# Patient Record
Sex: MALE | Race: WHITE | URBAN - METROPOLITAN AREA
[De-identification: names, ages, dates, MRNs, and addresses within clinical notes are randomized per-mention and may not be internally consistent; named-entity substitution may affect disease eponyms.]

---

## 2020-09-24 LAB
ABS NEUT (OLG): 4.5 X10(3)/MCL (ref 2.1–9.2)
ALBUMIN SERPL-MCNC: 4.4 GM/DL (ref 3.5–5)
ALBUMIN/GLOB SERPL: 1.4 RATIO (ref 1.1–2)
ALP SERPL-CCNC: 75 UNIT/L (ref 40–150)
ALT SERPL-CCNC: 15 UNIT/L (ref 0–55)
AST SERPL-CCNC: 16 UNIT/L (ref 5–34)
BASOPHILS # BLD AUTO: 0 X10(3)/MCL (ref 0–0.2)
BASOPHILS NFR BLD AUTO: 1 %
BILIRUB SERPL-MCNC: 0.8 MG/DL
BILIRUBIN DIRECT+TOT PNL SERPL-MCNC: 0.3 MG/DL (ref 0–0.5)
BILIRUBIN DIRECT+TOT PNL SERPL-MCNC: 0.5 MG/DL (ref 0–0.8)
BUN SERPL-MCNC: 17 MG/DL (ref 8.9–20.6)
CALCIUM SERPL-MCNC: 9.4 MG/DL (ref 8.4–10.2)
CHLORIDE SERPL-SCNC: 105 MMOL/L (ref 98–107)
CO2 SERPL-SCNC: 29 MMOL/L (ref 22–29)
CREAT SERPL-MCNC: 1.43 MG/DL (ref 0.73–1.18)
EOSINOPHIL # BLD AUTO: 0.1 X10(3)/MCL (ref 0–0.9)
EOSINOPHIL NFR BLD AUTO: 2 %
ERYTHROCYTE [DISTWIDTH] IN BLOOD BY AUTOMATED COUNT: 12.4 % (ref 11.5–17)
GLOBULIN SER-MCNC: 3.2 GM/DL (ref 2.4–3.5)
GLUCOSE SERPL-MCNC: 90 MG/DL (ref 74–100)
HCT VFR BLD AUTO: 42 % (ref 42–52)
HGB BLD-MCNC: 14.6 GM/DL (ref 14–18)
LYMPHOCYTES # BLD AUTO: 1.5 X10(3)/MCL (ref 0.6–4.6)
LYMPHOCYTES NFR BLD AUTO: 23 %
MCH RBC QN AUTO: 29.1 PG (ref 27–31)
MCHC RBC AUTO-ENTMCNC: 34.8 GM/DL (ref 33–36)
MCV RBC AUTO: 83.7 FL (ref 80–94)
MONOCYTES # BLD AUTO: 0.4 X10(3)/MCL (ref 0.1–1.3)
MONOCYTES NFR BLD AUTO: 6 %
NEUTROPHILS # BLD AUTO: 4.5 X10(3)/MCL (ref 2.1–9.2)
NEUTROPHILS NFR BLD AUTO: 68 %
PLATELET # BLD AUTO: 282 X10(3)/MCL (ref 130–400)
PMV BLD AUTO: 9.9 FL (ref 9.4–12.4)
POTASSIUM SERPL-SCNC: 4.8 MMOL/L (ref 3.5–5.1)
PROT SERPL-MCNC: 7.6 GM/DL (ref 6.4–8.3)
RBC # BLD AUTO: 5.02 X10(6)/MCL (ref 4.7–6.1)
SODIUM SERPL-SCNC: 142 MMOL/L (ref 136–145)
WBC # SPEC AUTO: 6.6 X10(3)/MCL (ref 4.5–11.5)

## 2020-09-28 ENCOUNTER — HISTORICAL (OUTPATIENT)
Dept: SURGERY | Facility: HOSPITAL | Age: 45
End: 2020-09-28

## 2022-04-30 NOTE — OP NOTE
DATE OF SURGERY:    09/28/2020    SURGEON:  Joel Talbot MD    PROCEDURES:    1. Left renal extracorporeal shock wave lithotripsy.  2. Flexible cystoscopy with removal of indwelling left double-J ureteral stent.    ANESTHESIA:  General LMA.    PREOP DIAGNOSES:    1. Left renal calculus.  2. Indwelling left double-J ureteral stent.    POSTOP DIAGNOSES:    1. Left renal calculus.  2. Indwelling left double-J ureteral stent.    INDICATIONS:  This 45-year-old gentleman presented recently with left renal colic.  Left double-J stent was placed.  The stone migrated up into the left lower pole kidney, was faintly calcified.  Procedure today was indicated to try to fragment the stone and remove the bothersome stent.    OPERATIVE FINDINGS:  An 11 mm left lower pole, faintly calcified stone that appeared to fragment nicely post ESWL.  Indwelling double-J stent in good position, removed at the end of the procedure with flexible cystoscopy.    DESCRIPTION OF PROCEDURE:  The patient was taken to the operating room and placed supine on the Dornier lithotripsy table.  Placed under general LMA anesthesia.  Fluoroscopy was used and the stone was visualized in the left lower pole kidney.  AP and oblique images were used to position the stone within the cross hair for treatment.  Per protocol, treatment was started with shock rate of 60 per minute and a power level of 1, gradually increasing to shock rate of 90 per minute and power level of 7.  A total of 2700 shocks were given and the stone did appear to fragment and change configuration, indicative of fragmentation.     Lower abdomen and genitalia were then prepped and draped and flexible cystoscopy was performed.  Endoscopic forceps were used to grasp the indwelling left double-J ureteral stent and carefully remove it.  Xylocaine jelly was placed per urethra.  The patient tolerated these procedures well.  He did not have any     cardiac arrhythmias during the  lithotripsy procedure.  Vital signs were stable and he was awakened and transferred to PACU in stable condition.        ______________________________  Joel Talbot MD    EFNENA/UD  DD:  09/28/2020  Time:  10:10AM  DT:  09/28/2020  Time:  10:36AM  Job #:  [id number]

## 2024-05-24 ENCOUNTER — TELEPHONE (OUTPATIENT)
Dept: SURGERY | Facility: CLINIC | Age: 49
End: 2024-05-24

## 2024-07-02 RX ORDER — ATORVASTATIN CALCIUM 40 MG/1
40 TABLET, FILM COATED ORAL
COMMUNITY
Start: 2024-04-09

## 2024-07-02 RX ORDER — LISINOPRIL 40 MG/1
40 TABLET ORAL
COMMUNITY
Start: 2024-04-09

## 2024-07-02 RX ORDER — TESTOSTERONE CYPIONATE 200 MG/ML
INJECTION, SOLUTION INTRAMUSCULAR
COMMUNITY
Start: 2024-05-01

## 2024-07-30 ENCOUNTER — OFFICE VISIT (OUTPATIENT)
Dept: SURGERY | Facility: CLINIC | Age: 49
End: 2024-07-30
Payer: COMMERCIAL

## 2024-07-30 ENCOUNTER — CLINICAL SUPPORT (OUTPATIENT)
Dept: BARIATRICS | Facility: HOSPITAL | Age: 49
End: 2024-07-30

## 2024-07-30 VITALS
DIASTOLIC BLOOD PRESSURE: 90 MMHG | SYSTOLIC BLOOD PRESSURE: 144 MMHG | WEIGHT: 293.69 LBS | BODY MASS INDEX: 44.51 KG/M2 | BODY MASS INDEX: 44.51 KG/M2 | HEIGHT: 68 IN | HEART RATE: 69 BPM | WEIGHT: 293.69 LBS | HEIGHT: 68 IN

## 2024-07-30 VITALS — WEIGHT: 297.31 LBS

## 2024-07-30 DIAGNOSIS — E66.01 MORBID OBESITY WITH BMI OF 45.0-49.9, ADULT: Primary | ICD-10-CM

## 2024-07-30 DIAGNOSIS — E66.9 OBESITY: Primary | ICD-10-CM

## 2024-07-30 PROCEDURE — 3008F BODY MASS INDEX DOCD: CPT | Mod: CPTII,,, | Performed by: SURGERY

## 2024-07-30 PROCEDURE — 99204 OFFICE O/P NEW MOD 45 MIN: CPT | Mod: ,,, | Performed by: SURGERY

## 2024-07-30 PROCEDURE — 3080F DIAST BP >= 90 MM HG: CPT | Mod: CPTII,,, | Performed by: SURGERY

## 2024-07-30 PROCEDURE — 3077F SYST BP >= 140 MM HG: CPT | Mod: CPTII,,, | Performed by: SURGERY

## 2024-07-30 PROCEDURE — 4010F ACE/ARB THERAPY RXD/TAKEN: CPT | Mod: CPTII,,, | Performed by: SURGERY

## 2024-07-30 RX ORDER — ALLOPURINOL 100 MG/1
TABLET ORAL DAILY
COMMUNITY

## 2024-07-30 RX ORDER — INDOMETHACIN 50 MG/1
CAPSULE ORAL
COMMUNITY
Start: 2024-07-14

## 2024-07-30 NOTE — PROGRESS NOTES
NUTRITIONAL CONSULT    Initial assessment for sleeve gastrectomy  Non Surgical: []    PAST HISTORY:   Dieting attempts include ... Intermittent fasting, working out,   Highest adult weight: 318#  How many years at present wt: 6 months  Greatest single wt loss: 80#, intermittent fasting and working out    CLINICAL DATA:  Height:   Ht Readings from Last 1 Encounters:   No data found for Ht      Weight:   Wt Readings from Last 3 Encounters:   24 1431 134.9 kg (297 lb 4.8 oz)      IBW: 148 lbs   BMI:   BMI Readings from Last 1 Encounters:   No data found for BMI      Bariatric goal weight (125% EBW): 185 lbs  Patient's goal weight: 215 lbs    FAMILY HISTORY OF OBESITY:  Yes           WHAT SIDE OF THE FAMILY?   []Mother   []Father   []Sibling   []Child     [x]Extended    []Adopted       Goal for Bariatric Surgery: to improve health, to improve quality of life, to lose weight, and to prevent future medical conditions    NUTRITION & HEALTH HISTORY:  Greatest challenge: dining out frequency, portion control, and high-fat diet    Current diet recall:   Dinner: 1/2 pizza- intermittent fasting    Current Dietary Patterns:  Meal pattern: 1 intermittent fasting  Snackin / day Type: before bed - cheese toast   Vegetables: Likes a few. Eats rarely.  Fruits: Likes a few. Eats rarely.  Beverages: water and diet soda  Alcohol consumption: Never. Type:   Dining out: Weekly. Mostly fast food and restaurants. 50% of the time- more on weekends  Grocery shopping and food prep: Yes and No[x]Self   [x]Spouse   []Other:   Emotional eating: No Which emotions if yes:   Nighttime snacking: Yes Middle of night: No Before bedtime: Yes  Hx of disordered eating behaviors: No Anorexia: No Bulimia: No Purging: No Binging:No  History of vitamin/mineral deficiencies:   No    Vitamins / Minerals / Herbs:   none    Food Allergies:   none      ASSESSMENT:  Patient reports attempts at weight loss, only to regain lost weight.  Patient demonstrated  knowledge of healthy eating behaviors and exercise patterns; admits to not eating healthy and not exercising at this point.  Patient states willingness to change lifestyle and make behavior modifications.  Expect good  compliance after surgery at this time.        ESTIMATED NEEDS: 84 kg or 181#  Calories: 4889-5922 (20-25 kcal/kg adjusted BW/d)  Protein: 84-92 (1.0-1.1 g/kg adjusted BW/d)  Fluid:  1680 (20 mL/kg actual BW/d)    BARIATRIC DIET DISCUSSION:  Discussed diet after surgery and related to patients food record.  Reviewed diet progression before and after surgery.  Reinforced that surgery is not a magic bullet and importance of low fat foods and no snacking.  Answered all questions.    RECOMMENDATIONS:  Patient is a potential candidate for bariatric surgery.      PLAN:  Continue to review Bariatric Nutrition Guidebook at home and call with any questions.  Work on Bariatric Nutrition Checklist.  5-6 meals per day.  Reduce frequency of dining out.

## 2024-07-30 NOTE — PROGRESS NOTES
BEHAVIOR MODIFICATION AND EXERCISE CONSULT    PERSONAL:     What initiated your interest in bariatric surgery?   Tried to lose weight in the past.     Marital Status: []Single   [x]   []   []      Do you have children? 2     Do you have childcare issues?     What is your highest level of education completed? Masters    Who is your social or relational support? wife    Do you work? 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 Sometimes it is stressful    Were there any particular events that lead to significant weight gain? []Loss of a loved one  []Pregnancy  []Trauma, accident, illness  []Loss of employment [x]Other (covid)    PHYSICAL ACTIVITY:    Do you currently exercise: [x]Yes   []No    If so, please describe: walk    Have you experienced any injuries and/or restrictions that may limit your physical activity? [x]Yes   []No    If so, please describe:     BEHAVIORS:    What behavior(s) would you like to change in order to be healthy? Eating healthier, portion control,     On a scale of 1-10 (1-extremely low, 10-extremely high), how motivated are you to change your behavior(s)? 10    Do you currently use any type of tobacco products (vape, dip, cigarettes, etc.) No    If yes, on average, how many and/or how often do you use these products on a daily basis?    How many hours of sleep do you average? 8    Rate your stress level on a scale of 1-10 (1-extremely low, 10-extremely high) 4/5    What is your biggest stressor? work    Is your appetite affected by stress, boredom, depression, etc.? no    How do you cope and/or manage stress? Fish, play with son,     Have you ever seen a counselor or therapist?      CURRENT WEIGHT: 293.7 HIGHEST WEIGHT:318 LOWEST ADULT WEIGHT: 198 GOAL WEIGHT: 215    WAIST/HIP:    HANDOUTS:    NOTES:       Genesis De La Rosa, ROSA ISELA, CPT, CHC

## 2024-08-08 LAB — UREA BREATH TEST QL: NEGATIVE

## 2024-08-12 ENCOUNTER — TELEPHONE (OUTPATIENT)
Dept: SURGERY | Facility: CLINIC | Age: 49
End: 2024-08-12
Payer: COMMERCIAL

## 2024-08-13 DIAGNOSIS — E66.01 MORBID OBESITY WITH BMI OF 45.0-49.9, ADULT: Primary | ICD-10-CM

## 2024-08-19 DIAGNOSIS — E66.01 MORBID OBESITY WITH BMI OF 45.0-49.9, ADULT: Primary | ICD-10-CM

## 2024-08-20 ENCOUNTER — CLINICAL SUPPORT (OUTPATIENT)
Dept: BARIATRICS | Facility: HOSPITAL | Age: 49
End: 2024-08-20

## 2024-08-20 VITALS — WEIGHT: 299.19 LBS | BODY MASS INDEX: 45.34 KG/M2 | HEIGHT: 68 IN

## 2024-08-20 DIAGNOSIS — E66.01 MORBID OBESITY WITH BMI OF 45.0-49.9, ADULT: Primary | ICD-10-CM

## 2024-08-20 NOTE — PROGRESS NOTES
Date of education: 8/20/24  Pt education type: [x]Pre op  []Post op  Surgery date: 9/16/24  Type of surgery: sleeve gastrectomy     Education was provided on:   [x]Importance of protein and vitamin protocol  [x]Importance of drinking  fl oz/day of non carbonated sugar free non caffeinated beverages  [x]Importance of following dietary protocol  []Importance of early ambulation postop   []Use of incentive spirometer 10 times an hour while awake  []Non opiod pain management post op   []Discontinuing use of meds containing aspirin, ibuprofen, NSAIDs, post op  []Signs and symptoms of immediate and long term complications post-op  []Prevention and signs and symptoms of blood clots   []Prevention and signs of infection  []Reviewed medication regimen  []Importance of adhering to behavioral changes  []Importance of following exercise protocol      Barriers to learning:  [x]None evident  []Acuity of illness  []Cognitive defects  []Cultural barriers  []Desire/Motivation  []Difficulty concentrating  []Emotional state  []Financial concerns  []Hearing deficit  []Language barrier  []Literacy  []Memory problems  []Vision impairment     Teaching methods:  []Demonstration  [x]Explanation  [x]Printed materials  [x]Teach back  []Virtual/web based    Expected Compliance:  [x]Good  []Fair  []Poor    Additional Notes:

## 2024-08-21 NOTE — PROGRESS NOTES
Date of education: 8/20/24  Pt education type: [x]Pre op  []Post op  Surgery date: 9/16/24  Type of surgery: sleeve gastrectomy     Education was provided on:   []Importance of protein and vitamin protocol  []Importance of drinking  fl oz/day of non carbonated sugar free non caffeinated beverages  []Importance of following dietary protocol  [x]Importance of early ambulation postop   [x]Use of incentive spirometer 10 times an hour while awake  [x]Non opiod pain management post op   [x]Discontinuing use of meds containing aspirin, ibuprofen, NSAIDs, post op  [x]Signs and symptoms of immediate and long term complications post-op  [x]Prevention and signs and symptoms of blood clots   [x]Prevention and signs of infection  [x]Reviewed medication regimen  []Importance of adhering to behavioral changes  []Importance of following exercise protocol      Barriers to learning:  [x]None evident  []Acuity of illness  []Cognitive defects  []Cultural barriers  []Desire/Motivation  []Difficulty concentrating  []Emotional state  []Financial concerns  []Hearing deficit  []Language barrier  []Literacy  []Memory problems  []Vision impairment     Teaching methods:  []Demonstration  [x]Explanation  [x]Printed materials  [x]Teach back  []Virtual/web based    Expected Compliance:  [x]Good  []Fair  []Poor    Additional Notes:

## 2024-08-27 PROBLEM — E66.01 MORBID OBESITY WITH BMI OF 45.0-49.9, ADULT: Status: ACTIVE | Noted: 2024-08-27

## 2024-08-27 NOTE — PROGRESS NOTES
"  [facility]  [address] [place] [id number]  Phone: [phone]  Fax: [phone]    Initial Bariatric Consultation  Dr. Yury Meza  1975    Author: GIULIANA Jenkins      History of Present Illness:  Patient is a 49 y.o. male who is referred for evaluation of surgical treatment of morbid obesity. His Body mass index is 45.32 kg/m².  He has attended the bariatric seminar and is most interested in the sleeve gastrectomy procedure. The patient has had multiple unsuccessful diet attempts in the past without sustained weight loss. With multiple unsuccessful weight loss attempts, the patient believes they are ready for a bariatric surgical intervention.     Denies any anemia, bleeding or clotting disorders, easy bruisability, or previous thrombosis. No family history of clotting issues.     Ht: 5' 7.5" (1.715 m)   Weights:     Trend Weights BMI   Weight today at consult 293 # Body mass index is 45.32 kg/m².       Estimated body mass index is 45.32 kg/m² as calculated from the following:    Height as of this encounter: 5' 7.5" (1.715 m).    Weight as of this encounter: 133.2 kg (293 lb 11.2 oz).      Pertinent History:  HTN - [x] Yes   [] No  HLD - [x] Yes   [] No  DM  -  [] Yes   [x] No  NATHANIEL - [x] Yes   [] No (uses CPAP)  GERD - [] Yes   [x] No  Anticoagulation- [] Yes   [x] No  Smoker- [] Yes   [x] No      Past Medical History:   Diagnosis Date    High cholesterol     Hypertension     Kidney stone     Skin cancer     Sleep apnea     uses CPAP     Past Surgical History:   Procedure Laterality Date    COLONOSCOPY      EYE SURGERY      NASAL SINUS SURGERY      Skin cancer removed      x5     Family History   Problem Relation Name Age of Onset    Hyperlipidemia Mother Alysa Meza     Hypertension Father Rodrigo Meza     Heart disease Father Rodrigo Meza     Kidney failure Paternal Grandmother Jada Meza  " "    Social History     Tobacco Use    Smoking status: Never    Smokeless tobacco: Never   Substance Use Topics    Alcohol use: Never    Drug use: Never          Review of patient's allergies indicates:  No Known Allergies    Current Outpatient Medications   Medication Sig Dispense Refill    atorvastatin (LIPITOR) 40 MG tablet Take 40 mg by mouth every evening.      indomethacin (INDOCIN) 50 MG capsule TAKE 1 CAPSULE BY MOUTH IN THE MORNING AT NOON AND AT BEDTIME FOR 10 DAYS      lisinopriL (PRINIVIL,ZESTRIL) 40 MG tablet Take 40 mg by mouth once daily.      testosterone cypionate (DEPOTESTOTERONE CYPIONATE) 200 mg/mL injection SMARTSI.5 Milliliter(s) IM Every 2 Weeks      allopurinoL (ZYLOPRIM) 100 MG tablet Take by mouth once daily.       No current facility-administered medications for this visit.       Patient Care Team:  Tl Santana MD as PCP - General (Family Medicine)  Yury Narvaez MD as Surgeon (Bariatrics)    Review of Systems:  Review of Systems   Constitutional: Negative.    HENT: Negative.     Eyes: Negative.    Respiratory: Negative.     Cardiovascular: Negative.    Gastrointestinal: Negative.    Endocrine: Negative.    Genitourinary: Negative.    Musculoskeletal: Negative.    Skin: Negative.    Allergic/Immunologic: Negative.    Neurological: Negative.    Psychiatric/Behavioral: Negative.     All other systems reviewed and are negative.      Physical:     Vitals:    24 1542   BP: (!) 144/90   Pulse: 69   Weight: 133.2 kg (293 lb 11.2 oz)   Height: 5' 7.5" (1.715 m)     Body mass index is 45.32 kg/m².    Physical Exam:  Physical Exam  Vitals reviewed.   Constitutional:       General: He is not in acute distress.     Appearance: Normal appearance. He is obese.   HENT:      Head: Normocephalic.   Eyes:      Conjunctiva/sclera: Conjunctivae normal.      Pupils: Pupils are equal, round, and reactive to light.   Cardiovascular:      Rate and Rhythm: Normal rate and regular rhythm.      " Pulses: Normal pulses.      Heart sounds: Normal heart sounds.   Pulmonary:      Effort: Pulmonary effort is normal. No respiratory distress.      Breath sounds: Normal breath sounds.   Abdominal:      General: Abdomen is flat. Bowel sounds are normal.      Palpations: Abdomen is soft.      Tenderness: There is no abdominal tenderness.   Musculoskeletal:         General: Normal range of motion.      Cervical back: Neck supple.   Skin:     General: Skin is warm and dry.   Neurological:      General: No focal deficit present.      Mental Status: He is alert and oriented to person, place, and time.   Psychiatric:         Mood and Affect: Mood normal.         Behavior: Behavior normal.         ASSESSMENT/PLAN:        1. Morbid obesity with BMI of 45.0-49.9, adult            Plan:  Deion Meza has morbid obesity as their Body mass index is 45.32 kg/m². He would benefit from weight loss surgery and has chosen Laparoscopic sleeve gastrectomy as the preferred procedure. He understands that this is a tool and lifestyle change will be necessary to keep weight off. Dr. Yury Narvaez discussed possible complications of all surgeries with the patient and he is agreeable to surgery. Patient informed that sleeve gastrectomy may result in symptoms of worsening reflux, possibly requiring long term medication, or additional surgical intervention in the future (including but not limited to conversion to Anna-en-Y gastric bypass). Future surgical intervention may/may not be covered by patient's insurance. Patient accepts this risk and is willing to proceed with workup for sleeve gastrectomy procedure.     RECOMMENDATION: Weight loss surgery. The risks, benefits of bariatric surgery, and non-surgical alternatives were discussed at length and all  questions were answered. The patient verbalizes understanding and wishes to proceed.     - Order H Pylori serology  - Schedule EGD upper endoscopy  - Refer to bariatric department for  pre op education, nutritional counseling.   - Refer for pre op psychological evaluation.  - Dr. Narvaez examined patient, discussed recommendations, obtained informed consent, and answered all questions.     GIULIANA Jenkins      I, GIULIANA Benavides, am scribing for, and in the presence of, Yury Narvaez MD, performed the above scribed service and the documentation accurately describes the services I performed. I attest to the accuracy of the note.

## 2024-08-30 ENCOUNTER — ANESTHESIA EVENT (OUTPATIENT)
Dept: SURGERY | Facility: HOSPITAL | Age: 49
DRG: 621 | End: 2024-08-30
Payer: COMMERCIAL

## 2024-09-03 ENCOUNTER — CLINICAL SUPPORT (OUTPATIENT)
Dept: BARIATRICS | Facility: HOSPITAL | Age: 49
End: 2024-09-03

## 2024-09-03 ENCOUNTER — OFFICE VISIT (OUTPATIENT)
Dept: SURGERY | Facility: CLINIC | Age: 49
End: 2024-09-03
Payer: COMMERCIAL

## 2024-09-03 VITALS — BODY MASS INDEX: 44.1 KG/M2 | HEIGHT: 68 IN | WEIGHT: 291 LBS | BODY MASS INDEX: 44.9 KG/M2 | WEIGHT: 291 LBS

## 2024-09-03 VITALS
WEIGHT: 290 LBS | SYSTOLIC BLOOD PRESSURE: 144 MMHG | DIASTOLIC BLOOD PRESSURE: 84 MMHG | BODY MASS INDEX: 43.95 KG/M2 | HEIGHT: 68 IN | HEART RATE: 87 BPM

## 2024-09-03 DIAGNOSIS — E66.9 OBESITY: Primary | ICD-10-CM

## 2024-09-03 DIAGNOSIS — Z71.82 EXERCISE COUNSELING: ICD-10-CM

## 2024-09-03 DIAGNOSIS — E66.01 MORBID OBESITY WITH BMI OF 40.0-44.9, ADULT: Primary | ICD-10-CM

## 2024-09-03 DIAGNOSIS — Z01.818 PRE-OPERATIVE EXAMINATION: Primary | ICD-10-CM

## 2024-09-03 DIAGNOSIS — Z71.89 ENCOUNTER FOR PRE-BARIATRIC SURGERY COUNSELING AND EDUCATION: ICD-10-CM

## 2024-09-03 DIAGNOSIS — Z71.3 ENCOUNTER FOR WEIGHT LOSS COUNSELING: ICD-10-CM

## 2024-09-03 DIAGNOSIS — Z71.3 DIETARY COUNSELING: ICD-10-CM

## 2024-09-03 DIAGNOSIS — E66.01 MORBID OBESITY: ICD-10-CM

## 2024-09-03 PROCEDURE — 1159F MED LIST DOCD IN RCRD: CPT | Mod: CPTII,,, | Performed by: NURSE PRACTITIONER

## 2024-09-03 PROCEDURE — 4010F ACE/ARB THERAPY RXD/TAKEN: CPT | Mod: CPTII,,, | Performed by: NURSE PRACTITIONER

## 2024-09-03 PROCEDURE — 3008F BODY MASS INDEX DOCD: CPT | Mod: CPTII,,, | Performed by: NURSE PRACTITIONER

## 2024-09-03 PROCEDURE — 99214 OFFICE O/P EST MOD 30 MIN: CPT | Mod: ,,, | Performed by: NURSE PRACTITIONER

## 2024-09-03 PROCEDURE — 3077F SYST BP >= 140 MM HG: CPT | Mod: CPTII,,, | Performed by: NURSE PRACTITIONER

## 2024-09-03 PROCEDURE — 3079F DIAST BP 80-89 MM HG: CPT | Mod: CPTII,,, | Performed by: NURSE PRACTITIONER

## 2024-09-03 NOTE — PROGRESS NOTES
"  Patient ID: [id number]   Chief Complaint: Pre-op Exam (VSG 9/16/24)    HPI:     History of Present Illness:  Deion Meza is a 49 y.o. male here today for routine preop appt. The pt is scheduled for a laparoscopic sleeve gastrectomy on 9/16/24. Pt Pt denies any changes to  history since last examination. All preop requirements completed. No complaints. Ready to proceed.     H.Pylori (8/5/24): negative  EGD (8/30/24): normal    Ht: 67.5in  Weights:   Trend Weights BMI   Starting 293# 45   Pre-Op 290# 44   2 Week     2 Month     6 Month      1 Year     2 Year               For Adults 20 Years and Older:  BMI Weight Status   Below 18.5 Underweight   18.6-24.9 Normal/Healthy   25.0-29.9 Overweight   30.0 & Above Obese     Ideal Weight Range for Your Height: 5'7" = 121 - 158 lbs      Pertinent History:  HTN - [x] Yes   [] No    [] Resolved  HLD - [x] Yes   [] No    [] Resolved  DM  -  [] Yes   [x] No    [] Resolved  NATHANIEL - [x] Yes   [] No   [] Resolved  GERD - [] Yes   [x] No [] Resolved  Anticoagulation- [] Yes   [x] No      If yes, type: [] ASA [] Plavix [] Other    Patient Care Team:  Tl Santana MD as PCP - General (Family Medicine)  Yury Narvaez MD as Surgeon (Bariatrics)     Subjective:     See HPI for details    Constitutional: Denies Change in appetite. Denies Chills. Denies Fever. Denies Night sweats.  Respiratory: Denies Cough. Denies Shortness of breath. Denies Shortness of breath with exertion. Denies Wheezing.  Cardiovascular: Denies Chest pain at rest. Denies Chest pain with exertion. Denies Irregular heartbeat. Denies Palpitations. Denies Edema.  Gastrointestinal: Denies Abdominal pain. DeniesDiarrhea. Denies Nausea. Denies Vomiting. Denies Hematemesis or Hematochezia.  Genitourinary: Denies Dysuria. Denies Urinary frequency. Denies Urinary urgency. Denies Blood in urine.  Endocrine: Denies Cold intolerance. Denies Excessive thirst. Denies Heat intolerance. Endorses Weight loss. " "  Musculoskeletal: Denies Painful joints. Denies Weakness.  Integumentary: Denies Rash. Denies Itching. Denies Dry skin.  Neurologic: Denies Dizziness. Denies Fainting. Denies Headache.  Psychiatric: Denies Depression. Denies Anxiety. Denies Suicidal/Homicidal ideations.    12 point review of systems conducted, negative except as stated in the history of present illness. See HPI for details.    Review of patient's allergies indicates:  No Known Allergies  Past Medical History:   Diagnosis Date    High cholesterol     Hypertension     Kidney stone     Morbid obesity with BMI of 45.0-49.9, adult     Skin cancer     Sleep apnea     uses CPAP     Past Surgical History:   Procedure Laterality Date    COLONOSCOPY      EYE SURGERY      NASAL SINUS SURGERY      Skin cancer removed      x5     Family History   Problem Relation Name Age of Onset    Hyperlipidemia Mother Alysa Meza     Hypertension Father Rodrigo Meza     Heart disease Father Rodrigo Meza     Kidney failure Paternal Grandmother Jada Meza      Social History     Tobacco Use    Smoking status: Never    Smokeless tobacco: Never   Substance Use Topics    Alcohol use: Never    Drug use: Never      Current Outpatient Medications   Medication Instructions    allopurinoL (ZYLOPRIM) 100 MG tablet Oral, Daily    atorvastatin (LIPITOR) 40 mg, Oral, Nightly    indomethacin (INDOCIN) 50 MG capsule TAKE 1 CAPSULE BY MOUTH IN THE MORNING AT NOON AND AT BEDTIME FOR 10 DAYS    lisinopriL (PRINIVIL,ZESTRIL) 40 mg, Oral, Daily    testosterone cypionate (DEPOTESTOTERONE CYPIONATE) 200 mg/mL injection SMARTSI.5 Milliliter(s) IM Every 2 Weeks       Objective:     Vital Signs (Most Recent):  Visit Vitals  BP (!) 144/84   Pulse 87   Ht 5' 7.5" (1.715 m)   Wt 131.5 kg (290 lb)   BMI 44.75 kg/m²       Physical Exam:  General:  Alert and oriented.    Respiratory:  Lungs are clear to auscultation, Respirations are non-labored, Breath sounds are equal.    Cardiovascular:  " Normal rate, Regular rhythm, No murmur.    Gastrointestinal:  Soft, Non-tender, Non-distended, Normal bowel sounds        Musculoskeletal:  Normal range of motion, Normal strength.    Integumentary:  Warm, Dry, Pink.    Neurologic:  Alert, Oriented.    Psychiatric:  Cooperative.      Assessment:       ICD-10-CM ICD-9-CM   1. Pre-operative examination  Z01.818 V72.84   2. Encounter for weight loss counseling  Z71.3 V65.3   3. Encounter for pre-bariatric surgery counseling and education  Z71.89 V65.49   4. Exercise counseling  Z71.82 V65.41   5. Dietary counseling  Z71.3 V65.3   6. Morbid obesity  E66.01 278.01       Plan:     1. Pre-operative examination  APTT    Comprehensive Metabolic Panel    CBC Auto Differential      2. Encounter for weight loss counseling        3. Encounter for pre-bariatric surgery counseling and education        4. Exercise counseling        5. Dietary counseling        6. Morbid obesity          Plan:     Deion Meza has voluntarily decided to undergo laparoscopic sleeve gastrectomy under the care of Yury Narvaez MD. The patient has stated that the performance of this procedure requires major intra-abdominal surgery and therefore carries certain risks. Like all major surgery, there is a chance of death during or immediately following the procedure. The risk varies depending on the age, weight and the medical background of each individual patient.    Additionally, there can be complications as a result of the procedure. General anesthesia is a required and a respirator is necessary during the operation. The risk is greater in patients with a history of smoking, that weigh more than 400 pounds, have a BMI >55 or can not walk up a flight of stairs.    Performance of the surgery requires a long division of the stomach with a special stapler. If leakage occurs, additional surgery or drainage procedures may need to be performed. In addition, since the stomach lies in close proximity  to the spleen, the possibility of splenic injury requiring splenectomy may occur.    In addition, complications may occur in wound healing. These may include wound infections, fascial disruptions and hernias which may require future surgical repair. Wound drainage is common in obese patients. In addition to these outlined complications, there can be additional problems which can occur in all major operations. These include infection, unexpected myocardial infarctions and the formation of blood clots leading to pulmonary embolism.    The patient voiced there understanding of the above and that the procedure may be converted to an open procedure if the surgeon feels the surgery can not safely continue laparoscopically.    The patient also voiced there understanding that patients who undergo this surgery generally lose a significant amount of weight and keep it off, but no operation can guarantee permanent weight reduction.    All questions were answered in regards to surgery. I once again reviewed all the risks / benefits of surgery with the patient in regards to the laparoscopic sleeve gastrectomy , the patient voiced their understanding and wishes to continue.     -  cardiac clearance received from No previous provider found, pt is a mild, moderate, high risk for procedure.   - Continue preop diet  - All questions answered and pt to sign consent with Everett Isaac MD AM of surgery    1. Pre-operative examination  -     APTT; Future; Expected date: 09/03/2024  -     Comprehensive Metabolic Panel; Future; Expected date: 09/03/2024  -     CBC Auto Differential; Future; Expected date: 09/03/2024    2. Encounter for weight loss counseling    3. Encounter for pre-bariatric surgery counseling and education    4. Exercise counseling    5. Dietary counseling    6. Morbid obesity       Future Appointments   Date Time Provider Department Center   9/9/2024  8:20 AM CLASS, [facility] BARIATRIC SURGERY CLASS [facility]    10/1/2024  1:30 PM Yury Narvaez MD [facility] [place] [facility]   11/19/2024  3:00 PM Ermelinda Doshi FNP [facility]   3/18/2025  2:30 PM Ermelinda Doshi FNP [facility] [place] [facility]   9/16/2025  1:00 PM Ermelinda Doshi FNP [facility]        FANNY Thorne

## 2024-09-03 NOTE — PROGRESS NOTES
"Pt attended pre-op visit with bariatric team and completed questionnaire. Pre- and immediate post-op guidelines were reviewed. Pt confirmed knowledge and expressed understanding.     Height:   Ht Readings from Last 1 Encounters:   09/03/24 5' 7.5" (1.715 m)      Weight:   Wt Readings from Last 3 Encounters:   09/03/24 1457 132 kg (291 lb)   09/03/24 1450 132 kg (291 lb)   08/25/24 1415 135.6 kg (299 lb)      BMI:   BMI Readings from Last 1 Encounters:   09/03/24 44.90 kg/m²        Weight loss since pre-op class:  -9#    "

## 2024-09-03 NOTE — H&P (VIEW-ONLY)
"  Patient ID: [id number]   Chief Complaint: Pre-op Exam (VSG 9/16/24)    HPI:     History of Present Illness:  Deion Meza is a 49 y.o. male here today for routine preop appt. The pt is scheduled for a laparoscopic sleeve gastrectomy on 9/16/24. Pt Pt denies any changes to  history since last examination. All preop requirements completed. No complaints. Ready to proceed.     H.Pylori (8/5/24): negative  EGD (8/30/24): normal    Ht: 67.5in  Weights:   Trend Weights BMI   Starting 293# 45   Pre-Op 290# 44   2 Week     2 Month     6 Month      1 Year     2 Year               For Adults 20 Years and Older:  BMI Weight Status   Below 18.5 Underweight   18.6-24.9 Normal/Healthy   25.0-29.9 Overweight   30.0 & Above Obese     Ideal Weight Range for Your Height: 5'7" = 121 - 158 lbs      Pertinent History:  HTN - [x] Yes   [] No    [] Resolved  HLD - [x] Yes   [] No    [] Resolved  DM  -  [] Yes   [x] No    [] Resolved  NATHANIEL - [x] Yes   [] No   [] Resolved  GERD - [] Yes   [x] No [] Resolved  Anticoagulation- [] Yes   [x] No      If yes, type: [] ASA [] Plavix [] Other    Patient Care Team:  Tl Santana MD as PCP - General (Family Medicine)  Yury Narvaez MD as Surgeon (Bariatrics)     Subjective:     See HPI for details    Constitutional: Denies Change in appetite. Denies Chills. Denies Fever. Denies Night sweats.  Respiratory: Denies Cough. Denies Shortness of breath. Denies Shortness of breath with exertion. Denies Wheezing.  Cardiovascular: Denies Chest pain at rest. Denies Chest pain with exertion. Denies Irregular heartbeat. Denies Palpitations. Denies Edema.  Gastrointestinal: Denies Abdominal pain. DeniesDiarrhea. Denies Nausea. Denies Vomiting. Denies Hematemesis or Hematochezia.  Genitourinary: Denies Dysuria. Denies Urinary frequency. Denies Urinary urgency. Denies Blood in urine.  Endocrine: Denies Cold intolerance. Denies Excessive thirst. Denies Heat intolerance. Endorses Weight loss. " "  Musculoskeletal: Denies Painful joints. Denies Weakness.  Integumentary: Denies Rash. Denies Itching. Denies Dry skin.  Neurologic: Denies Dizziness. Denies Fainting. Denies Headache.  Psychiatric: Denies Depression. Denies Anxiety. Denies Suicidal/Homicidal ideations.    12 point review of systems conducted, negative except as stated in the history of present illness. See HPI for details.    Review of patient's allergies indicates:  No Known Allergies  Past Medical History:   Diagnosis Date    High cholesterol     Hypertension     Kidney stone     Morbid obesity with BMI of 45.0-49.9, adult     Skin cancer     Sleep apnea     uses CPAP     Past Surgical History:   Procedure Laterality Date    COLONOSCOPY      EYE SURGERY      NASAL SINUS SURGERY      Skin cancer removed      x5     Family History   Problem Relation Name Age of Onset    Hyperlipidemia Mother Alysa Meza     Hypertension Father Rodrigo Meza     Heart disease Father Rodrigo Meza     Kidney failure Paternal Grandmother Jada Meza      Social History     Tobacco Use    Smoking status: Never    Smokeless tobacco: Never   Substance Use Topics    Alcohol use: Never    Drug use: Never      Current Outpatient Medications   Medication Instructions    allopurinoL (ZYLOPRIM) 100 MG tablet Oral, Daily    atorvastatin (LIPITOR) 40 mg, Oral, Nightly    indomethacin (INDOCIN) 50 MG capsule TAKE 1 CAPSULE BY MOUTH IN THE MORNING AT NOON AND AT BEDTIME FOR 10 DAYS    lisinopriL (PRINIVIL,ZESTRIL) 40 mg, Oral, Daily    testosterone cypionate (DEPOTESTOTERONE CYPIONATE) 200 mg/mL injection SMARTSI.5 Milliliter(s) IM Every 2 Weeks       Objective:     Vital Signs (Most Recent):  Visit Vitals  BP (!) 144/84   Pulse 87   Ht 5' 7.5" (1.715 m)   Wt 131.5 kg (290 lb)   BMI 44.75 kg/m²       Physical Exam:  General:  Alert and oriented.    Respiratory:  Lungs are clear to auscultation, Respirations are non-labored, Breath sounds are equal.    Cardiovascular:  " Normal rate, Regular rhythm, No murmur.    Gastrointestinal:  Soft, Non-tender, Non-distended, Normal bowel sounds        Musculoskeletal:  Normal range of motion, Normal strength.    Integumentary:  Warm, Dry, Pink.    Neurologic:  Alert, Oriented.    Psychiatric:  Cooperative.      Assessment:       ICD-10-CM ICD-9-CM   1. Pre-operative examination  Z01.818 V72.84   2. Encounter for weight loss counseling  Z71.3 V65.3   3. Encounter for pre-bariatric surgery counseling and education  Z71.89 V65.49   4. Exercise counseling  Z71.82 V65.41   5. Dietary counseling  Z71.3 V65.3   6. Morbid obesity  E66.01 278.01       Plan:     1. Pre-operative examination  APTT    Comprehensive Metabolic Panel    CBC Auto Differential      2. Encounter for weight loss counseling        3. Encounter for pre-bariatric surgery counseling and education        4. Exercise counseling        5. Dietary counseling        6. Morbid obesity          Plan:     Deion Meza has voluntarily decided to undergo laparoscopic sleeve gastrectomy under the care of Yury Narvaez MD. The patient has stated that the performance of this procedure requires major intra-abdominal surgery and therefore carries certain risks. Like all major surgery, there is a chance of death during or immediately following the procedure. The risk varies depending on the age, weight and the medical background of each individual patient.    Additionally, there can be complications as a result of the procedure. General anesthesia is a required and a respirator is necessary during the operation. The risk is greater in patients with a history of smoking, that weigh more than 400 pounds, have a BMI >55 or can not walk up a flight of stairs.    Performance of the surgery requires a long division of the stomach with a special stapler. If leakage occurs, additional surgery or drainage procedures may need to be performed. In addition, since the stomach lies in close proximity  to the spleen, the possibility of splenic injury requiring splenectomy may occur.    In addition, complications may occur in wound healing. These may include wound infections, fascial disruptions and hernias which may require future surgical repair. Wound drainage is common in obese patients. In addition to these outlined complications, there can be additional problems which can occur in all major operations. These include infection, unexpected myocardial infarctions and the formation of blood clots leading to pulmonary embolism.    The patient voiced there understanding of the above and that the procedure may be converted to an open procedure if the surgeon feels the surgery can not safely continue laparoscopically.    The patient also voiced there understanding that patients who undergo this surgery generally lose a significant amount of weight and keep it off, but no operation can guarantee permanent weight reduction.    All questions were answered in regards to surgery. I once again reviewed all the risks / benefits of surgery with the patient in regards to the laparoscopic sleeve gastrectomy , the patient voiced their understanding and wishes to continue.     -  cardiac clearance received from No previous provider found, pt is a mild, moderate, high risk for procedure.   - Continue preop diet  - All questions answered and pt to sign consent with Everett Isaac MD AM of surgery    1. Pre-operative examination  -     APTT; Future; Expected date: 09/03/2024  -     Comprehensive Metabolic Panel; Future; Expected date: 09/03/2024  -     CBC Auto Differential; Future; Expected date: 09/03/2024    2. Encounter for weight loss counseling    3. Encounter for pre-bariatric surgery counseling and education    4. Exercise counseling    5. Dietary counseling    6. Morbid obesity       Future Appointments   Date Time Provider Department Center   9/9/2024  8:20 AM CLASS, [facility] BARIATRIC SURGERY CLASS [facility]    10/1/2024  1:30 PM Yury Narvaez MD [facility] [place] [facility]   11/19/2024  3:00 PM Ermelinda Doshi FNP [facility]   3/18/2025  2:30 PM Ermelinda Doshi FNP [facility] [place] [facility]   9/16/2025  1:00 PM Ermelinda Doshi FNP [facility]        FANNY Thorne

## 2024-09-03 NOTE — H&P
"  Patient ID: [id number]   Chief Complaint: Pre-op Exam (VSG 9/16/24)    HPI:     History of Present Illness:  Deion Meza is a 49 y.o. male here today for routine preop appt. The pt is scheduled for a laparoscopic sleeve gastrectomy on 9/16/24. Pt Pt denies any changes to  history since last examination. All preop requirements completed. No complaints. Ready to proceed.     H.Pylori (8/5/24): negative  EGD (8/30/24): normal    Ht: 67.5in  Weights:   Trend Weights BMI   Starting 293# 45   Pre-Op 290# 44   2 Week     2 Month     6 Month      1 Year     2 Year               For Adults 20 Years and Older:  BMI Weight Status   Below 18.5 Underweight   18.6-24.9 Normal/Healthy   25.0-29.9 Overweight   30.0 & Above Obese     Ideal Weight Range for Your Height: 5'7" = 121 - 158 lbs      Pertinent History:  HTN - [x] Yes   [] No    [] Resolved  HLD - [x] Yes   [] No    [] Resolved  DM  -  [] Yes   [x] No    [] Resolved  NATHANIEL - [x] Yes   [] No   [] Resolved  GERD - [] Yes   [x] No [] Resolved  Anticoagulation- [] Yes   [x] No      If yes, type: [] ASA [] Plavix [] Other    Patient Care Team:  Tl Santana MD as PCP - General (Family Medicine)  Yury Narvaez MD as Surgeon (Bariatrics)     Subjective:     See HPI for details    Constitutional: Denies Change in appetite. Denies Chills. Denies Fever. Denies Night sweats.  Respiratory: Denies Cough. Denies Shortness of breath. Denies Shortness of breath with exertion. Denies Wheezing.  Cardiovascular: Denies Chest pain at rest. Denies Chest pain with exertion. Denies Irregular heartbeat. Denies Palpitations. Denies Edema.  Gastrointestinal: Denies Abdominal pain. DeniesDiarrhea. Denies Nausea. Denies Vomiting. Denies Hematemesis or Hematochezia.  Genitourinary: Denies Dysuria. Denies Urinary frequency. Denies Urinary urgency. Denies Blood in urine.  Endocrine: Denies Cold intolerance. Denies Excessive thirst. Denies Heat intolerance. Endorses Weight loss. " "  Musculoskeletal: Denies Painful joints. Denies Weakness.  Integumentary: Denies Rash. Denies Itching. Denies Dry skin.  Neurologic: Denies Dizziness. Denies Fainting. Denies Headache.  Psychiatric: Denies Depression. Denies Anxiety. Denies Suicidal/Homicidal ideations.    12 point review of systems conducted, negative except as stated in the history of present illness. See HPI for details.    Review of patient's allergies indicates:  No Known Allergies  Past Medical History:   Diagnosis Date    High cholesterol     Hypertension     Kidney stone     Morbid obesity with BMI of 45.0-49.9, adult     Skin cancer     Sleep apnea     uses CPAP     Past Surgical History:   Procedure Laterality Date    COLONOSCOPY      EYE SURGERY      NASAL SINUS SURGERY      Skin cancer removed      x5     Family History   Problem Relation Name Age of Onset    Hyperlipidemia Mother Alysa Meza     Hypertension Father Rodrigo Meza     Heart disease Father Rodrigo Meza     Kidney failure Paternal Grandmother Jada Meza      Social History     Tobacco Use    Smoking status: Never    Smokeless tobacco: Never   Substance Use Topics    Alcohol use: Never    Drug use: Never      Current Outpatient Medications   Medication Instructions    allopurinoL (ZYLOPRIM) 100 MG tablet Oral, Daily    atorvastatin (LIPITOR) 40 mg, Oral, Nightly    indomethacin (INDOCIN) 50 MG capsule TAKE 1 CAPSULE BY MOUTH IN THE MORNING AT NOON AND AT BEDTIME FOR 10 DAYS    lisinopriL (PRINIVIL,ZESTRIL) 40 mg, Oral, Daily    testosterone cypionate (DEPOTESTOTERONE CYPIONATE) 200 mg/mL injection SMARTSI.5 Milliliter(s) IM Every 2 Weeks       Objective:     Vital Signs (Most Recent):  Visit Vitals  BP (!) 144/84   Pulse 87   Ht 5' 7.5" (1.715 m)   Wt 131.5 kg (290 lb)   BMI 44.75 kg/m²       Physical Exam:  General:  Alert and oriented.    Respiratory:  Lungs are clear to auscultation, Respirations are non-labored, Breath sounds are equal.    Cardiovascular:  " Normal rate, Regular rhythm, No murmur.    Gastrointestinal:  Soft, Non-tender, Non-distended, Normal bowel sounds        Musculoskeletal:  Normal range of motion, Normal strength.    Integumentary:  Warm, Dry, Pink.    Neurologic:  Alert, Oriented.    Psychiatric:  Cooperative.      Assessment:       ICD-10-CM ICD-9-CM   1. Pre-operative examination  Z01.818 V72.84   2. Encounter for weight loss counseling  Z71.3 V65.3   3. Encounter for pre-bariatric surgery counseling and education  Z71.89 V65.49   4. Exercise counseling  Z71.82 V65.41   5. Dietary counseling  Z71.3 V65.3   6. Morbid obesity  E66.01 278.01       Plan:     1. Pre-operative examination  APTT    Comprehensive Metabolic Panel    CBC Auto Differential      2. Encounter for weight loss counseling        3. Encounter for pre-bariatric surgery counseling and education        4. Exercise counseling        5. Dietary counseling        6. Morbid obesity          Plan:     Deion Meza has voluntarily decided to undergo laparoscopic sleeve gastrectomy under the care of Yury Narvaez MD. The patient has stated that the performance of this procedure requires major intra-abdominal surgery and therefore carries certain risks. Like all major surgery, there is a chance of death during or immediately following the procedure. The risk varies depending on the age, weight and the medical background of each individual patient.    Additionally, there can be complications as a result of the procedure. General anesthesia is a required and a respirator is necessary during the operation. The risk is greater in patients with a history of smoking, that weigh more than 400 pounds, have a BMI >55 or can not walk up a flight of stairs.    Performance of the surgery requires a long division of the stomach with a special stapler. If leakage occurs, additional surgery or drainage procedures may need to be performed. In addition, since the stomach lies in close proximity  to the spleen, the possibility of splenic injury requiring splenectomy may occur.    In addition, complications may occur in wound healing. These may include wound infections, fascial disruptions and hernias which may require future surgical repair. Wound drainage is common in obese patients. In addition to these outlined complications, there can be additional problems which can occur in all major operations. These include infection, unexpected myocardial infarctions and the formation of blood clots leading to pulmonary embolism.    The patient voiced there understanding of the above and that the procedure may be converted to an open procedure if the surgeon feels the surgery can not safely continue laparoscopically.    The patient also voiced there understanding that patients who undergo this surgery generally lose a significant amount of weight and keep it off, but no operation can guarantee permanent weight reduction.    All questions were answered in regards to surgery. I once again reviewed all the risks / benefits of surgery with the patient in regards to the laparoscopic sleeve gastrectomy , the patient voiced their understanding and wishes to continue.     -  cardiac clearance received from No previous provider found, pt is a mild, moderate, high risk for procedure.   - Continue preop diet  - All questions answered and pt to sign consent with Everett Isaac MD AM of surgery    1. Pre-operative examination  -     APTT; Future; Expected date: 09/03/2024  -     Comprehensive Metabolic Panel; Future; Expected date: 09/03/2024  -     CBC Auto Differential; Future; Expected date: 09/03/2024    2. Encounter for weight loss counseling    3. Encounter for pre-bariatric surgery counseling and education    4. Exercise counseling    5. Dietary counseling    6. Morbid obesity       Future Appointments   Date Time Provider Department Center   9/9/2024  8:20 AM CLASS, [facility] BARIATRIC SURGERY CLASS [facility]    10/1/2024  1:30 PM Yury Narvaez MD [facility] [place] [facility]   11/19/2024  3:00 PM Ermelinda Doshi FNP [facility]   3/18/2025  2:30 PM Ermelinda Doshi FNP [facility] [place] [facility]   9/16/2025  1:00 PM Ermelinda Doshi FNP [facility]        FANNY Thorne

## 2024-09-03 NOTE — PROGRESS NOTES
Patient attended a preop education visit with the team. Patient's current weight is 291 LBS. . Patient has lost 8 lbs. On the preop diet.    Patient missed 1 on questions 13-18. Corrections were discussed. No issues noted.     ROSA ISELA Rios, CPT, CHC

## 2024-09-09 ENCOUNTER — CLINICAL SUPPORT (OUTPATIENT)
Dept: BARIATRICS | Facility: HOSPITAL | Age: 49
End: 2024-09-09

## 2024-09-09 VITALS — WEIGHT: 290.38 LBS | BODY MASS INDEX: 44.81 KG/M2

## 2024-09-09 DIAGNOSIS — E66.01 MORBID OBESITY WITH BMI OF 40.0-44.9, ADULT: Primary | ICD-10-CM

## 2024-09-09 NOTE — PROGRESS NOTES
Patient present for weekly weight check.    Weighed-in at 290.4# - down 1# - asked if he's following diet and he said he's been drinking juice. Advised he should stop that and go ahead and cut out his fruit to make sure his liver has shrunk enough for surgery.

## 2024-09-12 NOTE — DISCHARGE INSTRUCTIONS
HOSPITAL DISCHARGE INSTRUCTIONS    Clinic Phone Numbers       Surgeons office number and after hours on call surgeon: [phone].    ALWAYS call the surgeons office PRIOR to going to an Urgent Care or the emergency room. If medical emergency, call 911.     Signs and Symptoms that would warrant a phone call of the office (regardless of the time of day):     Fever greater than 101 F     Uncontrolled pain that does not improve with pain medication     Uncontrolled nausea that does not improve with nausea medication      Vomiting     Shortness of breath     Chest Pain     Foul smelling drainage from incision and/or yellow or green drainage from incision     Red, hot painful incisions     Bloody bowel movements     ** If you feel as though it is a life-threatening emergency, call 911 and go to the emergency room**          Prescriptions     Medications     Pain medication (if needed) Tylenol over the counter is safe to take for discomfort     Anti-nausea medication (if needed)     Proton Pump Inhibitor (finish all 30 days), call [phone] if you did not receive 30 days of medication       Supplements     Chewable multivitamin- take 2 times a day (unless otherwise directed)     Chewable Calcium Citrate with D3- take 3 times a day (unless otherwise directed)     Iron tablet- take once daily      MiraLAX- take once daily for 2 weeks       Home Medications     Please review your medication list that you received at pre-op class as well as at the hospital instructions upon discharge to assure you are resuming all medications that are deemed  safe after surgery.      ** REMINDER- You should have scheduled your follow-up with your prescribing doctor for 1-week post-op**          Appointments     Surgeons Post-op Visits- please review orange sheet you received in the mail after surgery. Call [phone] if you need to reschedule your surgeons post-op visit.      Bariatric Team Post-op Visits- please review blue sheet  you received in your e-mail or please reference [facility] for your post-op appointments. . Call [phone] option 1 if you need to reschedule your bariatric team post-op visit.          Nutritional Considerations     Hydration is CRITICAL!     Daily fluid intake of  oz water     Water is more important than protein      Review list of allowable and non-allowable liquids in your Bariatric Booklet    The only fluids that count towards your water goal is water, sugar free, caffeine free flavored water        Diet progression          Continue the dietary protocol until you meet with the dietitian at your 2-week visit     Strive to reach protein goal. Only liquids counted toward your protein goal are protein shake, milk and approved yogurt     It is MANDATORY that you do not progress your diet without speaking to the dietitian to prevent potential complications          Incisional Care     Wash your hands before you touch your incisions or dressings     Remove any gauze or dressing over your incision. ONLY steri-strips (butter-fly band aids) should remain over your incision     Shower daily with an anti-bacterial soap (Hibiclens or Dial, orange bar).      Allow water to hit your back in the shower     Wet the incisions with water     Apply soap to a clean washcloth and wash over your incisions (do not scrub)     Rinse your incision with water and pat dry with a clean towel      Check your incisions daily for any redness, swelling, hot to touch, or bright red, green or yellow drainage.             Dark red, dried blood, indention of an incision, bruising may appear under the steri-strips. This is normal.     Do not apply any creams, ointments, etc. on the incisions.      Leave incision open to air (unless instructed otherwise)          Activity     Walk and/or ride a stationary bike 20 minutes a day     Do not lift, pull or push anything greater than 10 pounds for 4-6 weeks     Do not go the gym until you are  4-6 weeks post-op     Use your incentive spirometer (breathing machine) 10 x an hour for 1-2 weeks     Shower daily, DO NOT submerge yourself in water until 2 weeks post-op and cleared by surgeon          Post-Operative Expectations     A soreness is to be expected. Pain differs for everyone. Please refer to the pain scale and list of when to call the doctor regarding pain.     Nausea can last a few weeks after surgery due to the body getting adjusted to the new small stomach. Take anti-nausea as needed and stay HYDRATED. Slight dehydration will cause nausea.     Constipation is common after surgery. Take MiraLAX daily even if your bowel movements are regular. Please refer to the FAQs regarding constipation. Water is essential in preventing constipation.        Constipation after Bariatric Surgery  The Basics     ***Due to the change in your diet just prior to surgery and immediately after surgery, it is very common to have a change in your bowel movements in the immediate post op period. It is completely normal to not have a bowel movement everyday in the first few weeks after bariatric surgery due to decreased oral intake. It is common not to have your first bowel movement for 4-5 days after surgery. If you don't have your first bowel movement 7 days after surgery, please contact surgeon's office to discuss.     What is constipation? -- Constipation is a common problem that makes it hard to have bowel movements. Your bowel movements might be:  Too hard  Too small  Hard to get out  Happening fewer than 3 times a week  What causes constipation after bariatric surgery? -- Constipation can be caused by:  High protein diet and decrease in water intake after bariatric surgery  What other symptoms should I watch for? -- These symptoms could signal a more serious problem:  Blood in the toilet or on the toilet paper after having a bowel movement  Fever  Feeling weak  It could also be a sign of a problem if you have new  constipation without a change in your medicines or diet, and have never had constipation in the past.   Is there anything I can do on my own to get rid of constipation? -- Yes. Try these steps:  Begin your MiraLAX the first day at home and continue it everyday if you are having normal soft bowel movement, even after the two weeks.   Please call the office [phone] if you do not have a bowel movement within 5 days of surgery.   Okay to take over the counter stool softeners once to twice per day, in addition to daily Miralax, if needed to help with post op constipation.   If you begin to have mutliple loose bowel movements (more than 3 per day), discontinue the stool softeners and Miralax.   If you continue to have multiple loose bowel movements per day (more than 5 loose bowel movements per day for more than 2 days in a row) after you have quit taking stool softeners and Miralax, contact surgeon's office to discuss this.  [phone]

## 2024-09-13 NOTE — PRE-PROCEDURE INSTRUCTIONS
[facility]: Outpatient Surgery   Preprocedure Check-In Instructions       Your arrival time for your surgery or procedure is 5:00am.     We ask patients to arrive about 2 hours before surgery to allow for enough time to review your health history & medications, start your IV, complete any outstanding labwork or tests, and meet your Anesthesiologist.     You will arrive at [facility], [address] [place] [address]. Enter through the [place] entrance next to the Emergency Room, and come to the 6th floor to the Outpatient Surgery Department. If you need a wheelchair, please call [phone] for an attendant to meet you at the [place] entrance with a wheelchair.    Wait Times:  Due to inconsistent procedure completion times, an unexpected wait may occur.  The physicians would like you to be here in the event they run ahead of time.  We will keep you comfortable and informed while you are waiting.  We apologize in advance if this happens.    Visitory Policy:   You are allowed 2 adult visitors to be with you in the hospital. All hospital visitors should be in good current health. No small children.     What to Bring:   Please have your ID, insurance cards, and all home medication bottles with you at check in. Bring your CPAP machine if one is used at home.     Fasting:   Nothing to eat or drink after midnight the night before your procedure. This includes no ice, gum, hard candies, and/or tobacco products.   Follow your doctor's instructions for taking any medications on the morning of your procedure. If no instructions for taking medications were given, do not take any medications but bring your medications in their bottles to your procedure check in.     Follow your doctor's preoperative instructions regarding skin prep, bowel prep, bathing, or showering prior to your procedure. If any special soaps were provided to you, please use according to your doctor's instructions.  If no instructions were given from your doctor, take a good bath or shower with antibacterial soap the night before and the morning of your procedure. On the morning of procedure, wear loose, comfortable clothing. No lotions, makeup, perfumes, colognes, deodorant, or jewelry to your procedure. Removable items (glasses, contact lenses, dentures, retainers, hearing aids) need to be removed for your procedure. Bring your storage containers for these items if you wear them.     Artificial nails, body jewelry, eyelash extensions, and/or hair extensions with metal clips are not allowed during your surgery. If you currently wear any of these items, please arrange for them to be removed prior to your arrival to the hospital.     Outpatient or Same Day Surgeries:   Any patients receiving sedation/anesthesia are advised not to drive for 24 hours after their procedure. We do not allow patients to drive themselves home after discharge. If you are going home after your procedure, please have someone available to drive you home from the hospital.     You may call the Outpatient Surgery Department at [phone] with any questions or concerns. We are looking forward to meeting you and taking great care of you for your procedure. Thank you for choosing [facility] for your surgical needs.       Status: complete  Spoke with: patient  Call Time: [phone]

## 2024-09-15 NOTE — ANESTHESIA PREPROCEDURE EVALUATION
09/15/2024  Deion Meza is a 49 y.o., male with   -------------------------------------    High cholesterol    Hypertension    Kidney stone    Morbid obesity with BMI of 45.0-49.9, adult    Skin cancer    Sleep apnea    uses CPAP       And   ----------------------------    Colonoscopy    Esophagogastroduodenoscopy    Procedure: EGD (ESOPHAGOGASTRODUODENOSCOPY);  Surgeon: Yury Narvaez MD;  Location: [facility];  Service: General;  Laterality: N/A;    Eye surgery    Nasal sinus surgery    Skin cancer removed    x5     Sister is a CRNA         Presents for lap vertical gastric sleeve - most recent anesthetic for EGD two weeks ago without issues    Pt has had right sided neck dissection and flap in [facility] [place] then at [facility] - only one time had trouble with oversedation after surgery     +CPAP nasal at night As he has lost weight and went from BiPAP to Cpap and he tolerates it well    Pre-op Assessment    I have reviewed the NPO Status.      Review of Systems  Cardiovascular:     Hypertension                                  Hypertension         Pulmonary:        Sleep Apnea     Obstructive Sleep Apnea (NATHANIEL).           Renal/:  Chronic Renal Disease        Kidney Function/Disease                 Physical Exam  General: Well nourished, Cooperative, Alert and Oriented    Airway:  Mallampati: III   Mouth Opening: Normal  TM Distance: Normal  Tongue: Normal  Neck ROM: Normal ROM  Neck: Girth Increased  Bearded face   Dental:  Intact    Chest/Lungs:  Clear to auscultation, Normal Respiratory Rate    Heart:  Rate: Normal  Rhythm: Regular Rhythm       Latest Reference Range & Units 09/09/24 09:25   WBC 4.50 - 11.50 x10(3)/mcL 6.51   Hemoglobin 14.0 - 18.0 g/dL 14.9   Hematocrit 42.0 - 52.0 % 42.3   Platelet Count 130 - 400 x10(3)/mcL 275   PTT 23.2 - 33.7 seconds 45.9 (H)   Sodium 136 -  145 mmol/L 139   Potassium 3.5 - 5.1 mmol/L 4.5   Chloride 98 - 107 mmol/L 107   CO2 22 - 29 mmol/L 25   Anion Gap mEq/L 7.0   BUN 8.4 - 25.7 mg/dL 26.4 (H)   Creatinine 0.73 - 1.18 mg/dL 1.60 (H)   BUN/CREAT RATIO  17   eGFR mL/min/1.73/m2 52   Glucose 74 - 100 mg/dL 111 (H)   (H): Data is abnormally high    Anesthesia Plan  Type of Anesthesia, risks & benefits discussed:    Anesthesia Type: Gen ETT  Intra-op Monitoring Plan: Standard ASA Monitors  Post Op Pain Control Plan: IV/PO Opioids PRN and multimodal analgesia  Induction:  IV  Airway Plan: Direct  Informed Consent: Informed consent signed with the Patient and all parties understand the risks and agree with anesthesia plan.  All questions answered. Patient consented to blood products? No  ASA Score: 3  Day of Surgery Review of History & Physical: H&P Update referred to the surgeon/provider.  Anesthesia Plan Notes: Premedication with Midazolam  Technique GETA - video laryngoscope readily available  Special Techniques: Preemmptive analgesia with neurontin, zofran, acetaminophen and celebrex.  Max dose of fentanyl is 100mcg on induction +/- low dose ketamine +/- low dose precidex  PONV Prophylaxis   Tight hemodynamic control with goal to have BP <120/70 during closing and emergence and PACU - may require intraop hydralazine or labetolol at end of case       Ready For Surgery From Anesthesia Perspective.     .

## 2024-09-16 ENCOUNTER — ANESTHESIA (OUTPATIENT)
Dept: SURGERY | Facility: HOSPITAL | Age: 49
DRG: 621 | End: 2024-09-16
Payer: COMMERCIAL

## 2024-09-16 ENCOUNTER — HOSPITAL ENCOUNTER (INPATIENT)
Facility: HOSPITAL | Age: 49
LOS: 1 days | Discharge: HOME | DRG: 621 | End: 2024-09-17
Attending: SURGERY | Admitting: SURGERY
Payer: COMMERCIAL

## 2024-09-16 DIAGNOSIS — E66.01 MORBID OBESITY WITH BMI OF 45.0-49.9, ADULT: ICD-10-CM

## 2024-09-16 LAB
GROUP & RH: NORMAL
INDIRECT COOMBS: NORMAL
POCT GLUCOSE: 98 MG/DL (ref 70–110)
SPECIMEN OUTDATE: NORMAL

## 2024-09-16 PROCEDURE — 37000009 HC ANESTHESIA EA ADD 15 MINS: Performed by: SURGERY

## 2024-09-16 PROCEDURE — 71000015 HC POSTOP RECOV 1ST HR: Performed by: SURGERY

## 2024-09-16 PROCEDURE — 71000033 HC RECOVERY, INTIAL HOUR: Performed by: SURGERY

## 2024-09-16 PROCEDURE — D9220A PRA ANESTHESIA: Mod: ANES,,, | Performed by: ANESTHESIOLOGY

## 2024-09-16 PROCEDURE — 63600175 PHARM REV CODE 636 W HCPCS: Performed by: NURSE PRACTITIONER

## 2024-09-16 PROCEDURE — 43775 LAP SLEEVE GASTRECTOMY: CPT | Mod: 80,,, | Performed by: SURGERY

## 2024-09-16 PROCEDURE — 25000003 PHARM REV CODE 250: Performed by: NURSE PRACTITIONER

## 2024-09-16 PROCEDURE — 37000008 HC ANESTHESIA 1ST 15 MINUTES: Performed by: SURGERY

## 2024-09-16 PROCEDURE — D9220A PRA ANESTHESIA: Mod: CRNA,,, | Performed by: NURSE ANESTHETIST, CERTIFIED REGISTERED

## 2024-09-16 PROCEDURE — 0DB64Z3 EXCISION OF STOMACH, PERCUTANEOUS ENDOSCOPIC APPROACH, VERTICAL: ICD-10-PCS | Performed by: SURGERY

## 2024-09-16 PROCEDURE — 86900 BLOOD TYPING SEROLOGIC ABO: CPT | Performed by: NURSE PRACTITIONER

## 2024-09-16 PROCEDURE — 27201423 OPTIME MED/SURG SUP & DEVICES STERILE SUPPLY: Performed by: SURGERY

## 2024-09-16 PROCEDURE — 36000711: Performed by: SURGERY

## 2024-09-16 PROCEDURE — C9290 INJ, BUPIVACAINE LIPOSOME: HCPCS | Performed by: SURGERY

## 2024-09-16 PROCEDURE — 43775 LAP SLEEVE GASTRECTOMY: CPT | Mod: ,,, | Performed by: SURGERY

## 2024-09-16 PROCEDURE — 36000710: Performed by: SURGERY

## 2024-09-16 PROCEDURE — 63600175 PHARM REV CODE 636 W HCPCS: Performed by: ANESTHESIOLOGY

## 2024-09-16 PROCEDURE — 86901 BLOOD TYPING SEROLOGIC RH(D): CPT | Performed by: NURSE PRACTITIONER

## 2024-09-16 PROCEDURE — 25000003 PHARM REV CODE 250: Performed by: NURSE ANESTHETIST, CERTIFIED REGISTERED

## 2024-09-16 PROCEDURE — 27000221 HC OXYGEN, UP TO 24 HOURS

## 2024-09-16 PROCEDURE — 63600175 PHARM REV CODE 636 W HCPCS: Performed by: NURSE ANESTHETIST, CERTIFIED REGISTERED

## 2024-09-16 PROCEDURE — 11000001 HC ACUTE MED/SURG PRIVATE ROOM

## 2024-09-16 PROCEDURE — 63600175 PHARM REV CODE 636 W HCPCS: Performed by: SURGERY

## 2024-09-16 RX ORDER — ACETAMINOPHEN 500 MG
1000 TABLET ORAL EVERY 8 HOURS
Status: DISCONTINUED | OUTPATIENT
Start: 2024-09-17 | End: 2024-09-17 | Stop reason: HOSPADM

## 2024-09-16 RX ORDER — ONDANSETRON 4 MG/1
4 TABLET, ORALLY DISINTEGRATING ORAL EVERY 6 HOURS PRN
Status: DISCONTINUED | OUTPATIENT
Start: 2024-09-17 | End: 2024-09-17 | Stop reason: HOSPADM

## 2024-09-16 RX ORDER — ATORVASTATIN CALCIUM 40 MG/1
40 TABLET, FILM COATED ORAL NIGHTLY
Status: DISCONTINUED | OUTPATIENT
Start: 2024-09-17 | End: 2024-09-17 | Stop reason: HOSPADM

## 2024-09-16 RX ORDER — CELECOXIB 200 MG/1
200 CAPSULE ORAL
Status: COMPLETED | OUTPATIENT
Start: 2024-09-16 | End: 2024-09-16

## 2024-09-16 RX ORDER — HYDROMORPHONE HYDROCHLORIDE 2 MG/ML
0.5 INJECTION, SOLUTION INTRAMUSCULAR; INTRAVENOUS; SUBCUTANEOUS EVERY 5 MIN PRN
Status: DISCONTINUED | OUTPATIENT
Start: 2024-09-16 | End: 2024-09-16 | Stop reason: HOSPADM

## 2024-09-16 RX ORDER — DEXAMETHASONE SODIUM PHOSPHATE 4 MG/ML
INJECTION, SOLUTION INTRA-ARTICULAR; INTRALESIONAL; INTRAMUSCULAR; INTRAVENOUS; SOFT TISSUE
Status: DISCONTINUED | OUTPATIENT
Start: 2024-09-16 | End: 2024-09-16

## 2024-09-16 RX ORDER — CEFAZOLIN SODIUM 2 G/50ML
2 SOLUTION INTRAVENOUS
Status: DISCONTINUED | OUTPATIENT
Start: 2024-09-16 | End: 2024-09-16 | Stop reason: HOSPADM

## 2024-09-16 RX ORDER — MEPERIDINE HYDROCHLORIDE 25 MG/ML
12.5 INJECTION INTRAMUSCULAR; INTRAVENOUS; SUBCUTANEOUS EVERY 10 MIN PRN
Status: DISCONTINUED | OUTPATIENT
Start: 2024-09-16 | End: 2024-09-16 | Stop reason: HOSPADM

## 2024-09-16 RX ORDER — HEPARIN SODIUM 5000 [USP'U]/ML
5000 INJECTION, SOLUTION INTRAVENOUS; SUBCUTANEOUS DAILY
Status: DISCONTINUED | OUTPATIENT
Start: 2024-09-17 | End: 2024-09-17 | Stop reason: HOSPADM

## 2024-09-16 RX ORDER — ONDANSETRON 4 MG/1
4 TABLET, ORALLY DISINTEGRATING ORAL EVERY 6 HOURS PRN
Qty: 20 TABLET | Refills: 0 | Status: SHIPPED | OUTPATIENT
Start: 2024-09-17

## 2024-09-16 RX ORDER — ONDANSETRON HYDROCHLORIDE 2 MG/ML
4 INJECTION, SOLUTION INTRAVENOUS DAILY PRN
Status: DISCONTINUED | OUTPATIENT
Start: 2024-09-16 | End: 2024-09-16 | Stop reason: HOSPADM

## 2024-09-16 RX ORDER — LABETALOL HYDROCHLORIDE 5 MG/ML
10 INJECTION, SOLUTION INTRAVENOUS
Status: DISCONTINUED | OUTPATIENT
Start: 2024-09-16 | End: 2024-09-17 | Stop reason: HOSPADM

## 2024-09-16 RX ORDER — TRAMADOL HYDROCHLORIDE 50 MG/1
100 TABLET ORAL EVERY 6 HOURS PRN
Status: DISCONTINUED | OUTPATIENT
Start: 2024-09-16 | End: 2024-09-17 | Stop reason: HOSPADM

## 2024-09-16 RX ORDER — GLYCOPYRROLATE 0.2 MG/ML
INJECTION INTRAMUSCULAR; INTRAVENOUS
Status: DISCONTINUED | OUTPATIENT
Start: 2024-09-16 | End: 2024-09-16

## 2024-09-16 RX ORDER — MIDAZOLAM HYDROCHLORIDE 1 MG/ML
INJECTION INTRAMUSCULAR; INTRAVENOUS
Status: DISCONTINUED | OUTPATIENT
Start: 2024-09-16 | End: 2024-09-16

## 2024-09-16 RX ORDER — ACETAMINOPHEN 500 MG
1000 TABLET ORAL
Status: COMPLETED | OUTPATIENT
Start: 2024-09-16 | End: 2024-09-16

## 2024-09-16 RX ORDER — ACETAMINOPHEN 500 MG
1000 TABLET ORAL EVERY 8 HOURS
Qty: 18 TABLET | Refills: 0 | Status: SHIPPED | OUTPATIENT
Start: 2024-09-17 | End: 2024-09-20

## 2024-09-16 RX ORDER — SODIUM CHLORIDE 0.9 % (FLUSH) 0.9 %
10 SYRINGE (ML) INJECTION
Status: DISCONTINUED | OUTPATIENT
Start: 2024-09-16 | End: 2024-09-16 | Stop reason: HOSPADM

## 2024-09-16 RX ORDER — PROMETHAZINE HYDROCHLORIDE 25 MG/1
25 TABLET ORAL EVERY 6 HOURS PRN
Status: DISCONTINUED | OUTPATIENT
Start: 2024-09-16 | End: 2024-09-17 | Stop reason: HOSPADM

## 2024-09-16 RX ORDER — GLUCAGON 1 MG
1 KIT INJECTION
Status: DISCONTINUED | OUTPATIENT
Start: 2024-09-16 | End: 2024-09-16 | Stop reason: HOSPADM

## 2024-09-16 RX ORDER — PROCHLORPERAZINE EDISYLATE 5 MG/ML
5 INJECTION INTRAMUSCULAR; INTRAVENOUS EVERY 6 HOURS PRN
Status: DISCONTINUED | OUTPATIENT
Start: 2024-09-16 | End: 2024-09-17 | Stop reason: HOSPADM

## 2024-09-16 RX ORDER — LIDOCAINE HYDROCHLORIDE 20 MG/ML
INJECTION, SOLUTION EPIDURAL; INFILTRATION; INTRACAUDAL; PERINEURAL
Status: DISCONTINUED | OUTPATIENT
Start: 2024-09-16 | End: 2024-09-16

## 2024-09-16 RX ORDER — HYOSCYAMINE SULFATE 0.12 MG/1
0.12 TABLET SUBLINGUAL EVERY 4 HOURS PRN
Status: DISCONTINUED | OUTPATIENT
Start: 2024-09-16 | End: 2024-09-17 | Stop reason: HOSPADM

## 2024-09-16 RX ORDER — HEPARIN SODIUM 5000 [USP'U]/ML
5000 INJECTION, SOLUTION INTRAVENOUS; SUBCUTANEOUS
Status: COMPLETED | OUTPATIENT
Start: 2024-09-16 | End: 2024-09-16

## 2024-09-16 RX ORDER — HYDRALAZINE HYDROCHLORIDE 20 MG/ML
10 INJECTION INTRAMUSCULAR; INTRAVENOUS EVERY 6 HOURS PRN
Status: DISCONTINUED | OUTPATIENT
Start: 2024-09-16 | End: 2024-09-17 | Stop reason: HOSPADM

## 2024-09-16 RX ORDER — PROPOFOL 10 MG/ML
VIAL (ML) INTRAVENOUS
Status: DISCONTINUED | OUTPATIENT
Start: 2024-09-16 | End: 2024-09-16

## 2024-09-16 RX ORDER — ALLOPURINOL 100 MG/1
100 TABLET ORAL DAILY
Status: DISCONTINUED | OUTPATIENT
Start: 2024-09-17 | End: 2024-09-17 | Stop reason: HOSPADM

## 2024-09-16 RX ORDER — SODIUM CHLORIDE, SODIUM LACTATE, POTASSIUM CHLORIDE, CALCIUM CHLORIDE 600; 310; 30; 20 MG/100ML; MG/100ML; MG/100ML; MG/100ML
INJECTION, SOLUTION INTRAVENOUS CONTINUOUS
Status: DISCONTINUED | OUTPATIENT
Start: 2024-09-16 | End: 2024-09-17 | Stop reason: HOSPADM

## 2024-09-16 RX ORDER — FENTANYL CITRATE 50 UG/ML
INJECTION, SOLUTION INTRAMUSCULAR; INTRAVENOUS
Status: DISCONTINUED | OUTPATIENT
Start: 2024-09-16 | End: 2024-09-16

## 2024-09-16 RX ORDER — INSULIN ASPART 100 [IU]/ML
1-10 INJECTION, SOLUTION INTRAVENOUS; SUBCUTANEOUS
Status: DISCONTINUED | OUTPATIENT
Start: 2024-09-16 | End: 2024-09-17 | Stop reason: HOSPADM

## 2024-09-16 RX ORDER — ONDANSETRON HYDROCHLORIDE 2 MG/ML
INJECTION, SOLUTION INTRAVENOUS
Status: DISCONTINUED | OUTPATIENT
Start: 2024-09-16 | End: 2024-09-16

## 2024-09-16 RX ORDER — IBUPROFEN 200 MG
16 TABLET ORAL
Status: DISCONTINUED | OUTPATIENT
Start: 2024-09-16 | End: 2024-09-17 | Stop reason: HOSPADM

## 2024-09-16 RX ORDER — IBUPROFEN 200 MG
24 TABLET ORAL
Status: DISCONTINUED | OUTPATIENT
Start: 2024-09-16 | End: 2024-09-17 | Stop reason: HOSPADM

## 2024-09-16 RX ORDER — GABAPENTIN 300 MG/1
600 CAPSULE ORAL
Status: COMPLETED | OUTPATIENT
Start: 2024-09-16 | End: 2024-09-16

## 2024-09-16 RX ORDER — GLUCAGON 1 MG
1 KIT INJECTION
Status: DISCONTINUED | OUTPATIENT
Start: 2024-09-16 | End: 2024-09-17 | Stop reason: HOSPADM

## 2024-09-16 RX ORDER — ACETAMINOPHEN 10 MG/ML
1000 INJECTION, SOLUTION INTRAVENOUS EVERY 8 HOURS
Status: COMPLETED | OUTPATIENT
Start: 2024-09-16 | End: 2024-09-17

## 2024-09-16 RX ORDER — PANTOPRAZOLE SODIUM 40 MG/1
40 TABLET, DELAYED RELEASE ORAL DAILY
Status: DISCONTINUED | OUTPATIENT
Start: 2024-09-17 | End: 2024-09-17 | Stop reason: HOSPADM

## 2024-09-16 RX ORDER — PANTOPRAZOLE SODIUM 40 MG/1
40 TABLET, DELAYED RELEASE ORAL DAILY
Qty: 30 TABLET | Refills: 0 | Status: SHIPPED | OUTPATIENT
Start: 2024-09-17 | End: 2024-10-17

## 2024-09-16 RX ORDER — LISINOPRIL 20 MG/1
40 TABLET ORAL DAILY
Status: DISCONTINUED | OUTPATIENT
Start: 2024-09-17 | End: 2024-09-17 | Stop reason: HOSPADM

## 2024-09-16 RX ORDER — BUPIVACAINE HYDROCHLORIDE 5 MG/ML
INJECTION, SOLUTION EPIDURAL; INTRACAUDAL
Status: DISCONTINUED | OUTPATIENT
Start: 2024-09-16 | End: 2024-09-16 | Stop reason: HOSPADM

## 2024-09-16 RX ORDER — MIDAZOLAM HYDROCHLORIDE 2 MG/2ML
2 INJECTION, SOLUTION INTRAMUSCULAR; INTRAVENOUS ONCE
Status: DISCONTINUED | OUTPATIENT
Start: 2024-09-16 | End: 2024-09-16 | Stop reason: HOSPADM

## 2024-09-16 RX ORDER — KETOROLAC TROMETHAMINE 30 MG/ML
30 INJECTION, SOLUTION INTRAMUSCULAR; INTRAVENOUS EVERY 6 HOURS
Status: DISCONTINUED | OUTPATIENT
Start: 2024-09-16 | End: 2024-09-17 | Stop reason: HOSPADM

## 2024-09-16 RX ORDER — CLONIDINE 0.1 MG/24H
1 PATCH, EXTENDED RELEASE TRANSDERMAL ONCE AS NEEDED
Status: DISCONTINUED | OUTPATIENT
Start: 2024-09-16 | End: 2024-09-17 | Stop reason: HOSPADM

## 2024-09-16 RX ORDER — KETOROLAC TROMETHAMINE 10 MG/1
10 TABLET, FILM COATED ORAL EVERY 6 HOURS
Qty: 12 TABLET | Refills: 0 | Status: SHIPPED | OUTPATIENT
Start: 2024-09-16 | End: 2024-09-19

## 2024-09-16 RX ORDER — ONDANSETRON HYDROCHLORIDE 2 MG/ML
4 INJECTION, SOLUTION INTRAVENOUS EVERY 6 HOURS PRN
Status: ACTIVE | OUTPATIENT
Start: 2024-09-16 | End: 2024-09-17

## 2024-09-16 RX ORDER — PROMETHAZINE HYDROCHLORIDE 12.5 MG/1
12.5 TABLET ORAL EVERY 6 HOURS PRN
Qty: 20 TABLET | Refills: 0 | Status: SHIPPED | OUTPATIENT
Start: 2024-09-16

## 2024-09-16 RX ORDER — ROCURONIUM BROMIDE 10 MG/ML
INJECTION, SOLUTION INTRAVENOUS
Status: DISCONTINUED | OUTPATIENT
Start: 2024-09-16 | End: 2024-09-16

## 2024-09-16 RX ORDER — ONDANSETRON 4 MG/1
4 TABLET, ORALLY DISINTEGRATING ORAL
Status: COMPLETED | OUTPATIENT
Start: 2024-09-16 | End: 2024-09-16

## 2024-09-16 RX ADMIN — ONDANSETRON 4 MG: 4 TABLET, ORALLY DISINTEGRATING ORAL at 06:09

## 2024-09-16 RX ADMIN — PROPOFOL 180 MG: 10 INJECTION, EMULSION INTRAVENOUS at 07:09

## 2024-09-16 RX ADMIN — SUGAMMADEX 350 MG: 100 INJECTION, SOLUTION INTRAVENOUS at 08:09

## 2024-09-16 RX ADMIN — FENTANYL CITRATE 100 MCG: 50 INJECTION, SOLUTION INTRAMUSCULAR; INTRAVENOUS at 07:09

## 2024-09-16 RX ADMIN — GABAPENTIN 600 MG: 300 CAPSULE ORAL at 06:09

## 2024-09-16 RX ADMIN — HYDRALAZINE HYDROCHLORIDE 10 MG: 20 INJECTION INTRAMUSCULAR; INTRAVENOUS at 10:09

## 2024-09-16 RX ADMIN — KETOROLAC TROMETHAMINE 30 MG: 30 INJECTION, SOLUTION INTRAMUSCULAR; INTRAVENOUS at 06:09

## 2024-09-16 RX ADMIN — LIDOCAINE HYDROCHLORIDE 4 ML: 20 INJECTION, SOLUTION INTRAVENOUS at 07:09

## 2024-09-16 RX ADMIN — KETOROLAC TROMETHAMINE 30 MG: 30 INJECTION, SOLUTION INTRAMUSCULAR; INTRAVENOUS at 11:09

## 2024-09-16 RX ADMIN — SODIUM CHLORIDE, POTASSIUM CHLORIDE, SODIUM LACTATE AND CALCIUM CHLORIDE: 600; 310; 30; 20 INJECTION, SOLUTION INTRAVENOUS at 10:09

## 2024-09-16 RX ADMIN — CEFAZOLIN SODIUM 3 G: 2 SOLUTION INTRAVENOUS at 07:09

## 2024-09-16 RX ADMIN — HYDROMORPHONE HYDROCHLORIDE 0.5 MG: 2 INJECTION INTRAMUSCULAR; INTRAVENOUS; SUBCUTANEOUS at 09:09

## 2024-09-16 RX ADMIN — ACETAMINOPHEN 1000 MG: 10 INJECTION, SOLUTION INTRAVENOUS at 01:09

## 2024-09-16 RX ADMIN — GLYCOPYRROLATE 0.2 MG: 0.2 INJECTION INTRAMUSCULAR; INTRAVENOUS at 06:09

## 2024-09-16 RX ADMIN — ACETAMINOPHEN 1000 MG: 10 INJECTION, SOLUTION INTRAVENOUS at 10:09

## 2024-09-16 RX ADMIN — DEXAMETHASONE SODIUM PHOSPHATE 8 MG: 4 INJECTION, SOLUTION INTRA-ARTICULAR; INTRALESIONAL; INTRAMUSCULAR; INTRAVENOUS; SOFT TISSUE at 07:09

## 2024-09-16 RX ADMIN — ONDANSETRON 4 MG: 2 INJECTION INTRAMUSCULAR; INTRAVENOUS at 08:09

## 2024-09-16 RX ADMIN — MIDAZOLAM HYDROCHLORIDE 2 MG: 1 INJECTION, SOLUTION INTRAMUSCULAR; INTRAVENOUS at 06:09

## 2024-09-16 RX ADMIN — HEPARIN SODIUM 5000 UNITS: 5000 INJECTION, SOLUTION INTRAVENOUS; SUBCUTANEOUS at 06:09

## 2024-09-16 RX ADMIN — ROCURONIUM BROMIDE 70 MG: 10 SOLUTION INTRAVENOUS at 07:09

## 2024-09-16 RX ADMIN — SODIUM CHLORIDE, SODIUM GLUCONATE, SODIUM ACETATE, POTASSIUM CHLORIDE AND MAGNESIUM CHLORIDE: 526; 502; 368; 37; 30 INJECTION, SOLUTION INTRAVENOUS at 07:09

## 2024-09-16 RX ADMIN — KETOROLAC TROMETHAMINE 30 MG: 30 INJECTION, SOLUTION INTRAMUSCULAR; INTRAVENOUS at 12:09

## 2024-09-16 RX ADMIN — ROCURONIUM BROMIDE 30 MG: 10 SOLUTION INTRAVENOUS at 07:09

## 2024-09-16 RX ADMIN — SODIUM CHLORIDE, SODIUM GLUCONATE, SODIUM ACETATE, POTASSIUM CHLORIDE AND MAGNESIUM CHLORIDE: 526; 502; 368; 37; 30 INJECTION, SOLUTION INTRAVENOUS at 06:09

## 2024-09-16 RX ADMIN — PROPOFOL 180 MG: 10 INJECTION, EMULSION INTRAVENOUS at 08:09

## 2024-09-16 RX ADMIN — CELECOXIB 200 MG: 200 CAPSULE ORAL at 06:09

## 2024-09-16 RX ADMIN — ACETAMINOPHEN 1000 MG: 500 TABLET ORAL at 06:09

## 2024-09-16 NOTE — TRANSFER OF CARE
"Anesthesia Transfer of Care Note    Patient: Deion Meza    Procedure(s) Performed: Procedure(s) (LRB):  GASTRECTOMY, SLEEVE, LAPAROSCOPIC (N/A)    Patient location: PACU    Anesthesia Type: general    Transport from OR: Transported from OR on room air with adequate spontaneous ventilation    Post pain: adequate analgesia    Post assessment: no apparent anesthetic complications    Post vital signs: stable    Level of consciousness: responds to stimulation    Nausea/Vomiting: no nausea/vomiting    Complications: none    Transfer of care protocol was followed    Last vitals: Visit Vitals  /77   Pulse 71   Temp 36.9 °C (98.5 °F) (Oral)   Resp (!) 21   Ht 5' 7.5" (1.715 m)   Wt 135.6 kg (299 lb)   SpO2 98%   BMI 46.14 kg/m²     "

## 2024-09-16 NOTE — NURSING
Nurses Note -- 4 Eyes      9/16/2024   5:45 PM      Skin assessed during: Admit      [x] No Altered Skin Integrity Present    []Prevention Measures Documented      [] Yes- Altered Skin Integrity Present or Discovered   [] LDA Added if Not in Epic (Describe Wound)   [] New Altered Skin Integrity was Present on Admit and Documented in LDA   [] Wound Image Taken    Wound Care Consulted? No    Attending Nurse:  Yvrose Zuniga RN/Staff Member:   Mabel

## 2024-09-16 NOTE — PLAN OF CARE
Problem: Adult Inpatient Plan of Care  Goal: Plan of Care Review  Reactivated  Goal: Patient-Specific Goal (Individualized)  Reactivated  Goal: Absence of Hospital-Acquired Illness or Injury  Reactivated  Goal: Optimal Comfort and Wellbeing  Reactivated  Goal: Readiness for Transition of Care  Reactivated     Problem: Bariatric Environmental Safety  Goal: Safety Maintained with Care  Reactivated     Problem: Wound  Goal: Optimal Coping  Reactivated  Goal: Optimal Functional Ability  Reactivated  Goal: Absence of Infection Signs and Symptoms  Reactivated  Goal: Improved Oral Intake  Reactivated  Goal: Optimal Pain Control and Function  Reactivated  Goal: Skin Health and Integrity  Reactivated  Goal: Optimal Wound Healing  Reactivated     Problem: Fall Injury Risk  Goal: Absence of Fall and Fall-Related Injury  Reactivated

## 2024-09-16 NOTE — INTERVAL H&P NOTE
The patient has been examined and the H&P has been reviewed:    I concur with the findings and no changes have occurred since H&P was written.    Procedure risks, benefits and alternative options discussed and understood by patient/family.    Lap sleeve gastrectomy     Dr. Narvaez examined patient, discussed recommendations, obtained informed consent, and answered all questions.       Active Hospital Problems    Diagnosis  POA    *Morbid obesity with BMI of 45.0-49.9, adult [E66.01, Z68.42]  Not Applicable      Resolved Hospital Problems   No resolved problems to display.

## 2024-09-16 NOTE — OP NOTE
[facility] - Periop Services  Surgery Department  Operative Note    SUMMARY     Date of Procedure: 9/16/2024     Procedure: Procedure(s) (LRB):  GASTRECTOMY, SLEEVE, LAPAROSCOPIC (N/A)     Surgeons and Role:     * Yury Narvaez MD - Primary     * Everett Isaac MD    Assisting Surgeon: None    Pre-Operative Diagnosis: Morbid obesity with BMI of 45.0-49.9, adult [E66.01, Z68.42]    Post-Operative Diagnosis: Post-Op Diagnosis Codes:     * Morbid obesity with BMI of 45.0-49.9, adult [E66.01, Z68.42]    Anesthesia: General    Operative Findings (including complications, if any):  Fatty liver    Indication: Morbid Obesity and other co-morbid conditions.  Assistant at surgery required due to anatomical considerations.    Description of Technical Procedures: Patient was taken to the operating room. After the induction of anesthesia the abdomen was prepped and draped in the usual sterile fashion. An optical trocar was used to access the peritoneum. Pneumoperitoneum was completed under direct vision. Additional working ports were placed under direct vision. A liver retractor was placed. The patient had no hiatal hernia defect.   The pylorus was identified and 4 cm proximal to the pylorus the greater curvature of the stomach was mobilized with Harmoinc scapel in the  direction of  the fundus and angle of His. The GE junction was identified the left marie was identified.  A 34F blunt-tip bougie was placed  in the stomach under direct vision towards the pylorus along the lesser curve Sequential firings of green and blue linear cutter stapler were used to create the antral pouch and the sleeve portion sleeve gastrectomy. Attention was paid to not encroach upon the angular incisura, or the GE junction. The staple line was intact and hemostatic. Fibrin glue product was used on the staple line the bougie was removed  the liver retractor was removed under direct vision specimen was removed fascia closed 0 Vicryl  skin closed 4-0 Vicryl patient tolerated procedure well. The assistant at surgery was required to manipulate tissue and organs to facilitate safe surgery.      Estimated Blood Loss (EBL): * No values recorded between 9/16/2024  7:46 AM and 9/16/2024  8:29 AM *             Specimens: Product of sleeve gastrectromy            Condition: Good    Disposition: PACU - hemodynamically stable.    Attestation: I was present and scrubbed for the entire procedure.

## 2024-09-16 NOTE — ANESTHESIA POSTPROCEDURE EVALUATION
Anesthesia Post Evaluation    Patient: eDion Meza    Procedure(s) Performed: Procedure(s) (LRB):  GASTRECTOMY, SLEEVE, LAPAROSCOPIC (N/A)    Final Anesthesia Type: general      Patient location during evaluation: PACU  Patient participation: Yes- Able to Participate  Level of consciousness: awake and alert  Post-procedure vital signs: reviewed and stable  Pain management: adequate  Airway patency: patent    PONV status at discharge: No PONV  Anesthetic complications: no      Cardiovascular status: hemodynamically stable  Respiratory status: unassisted  Hydration status: euvolemic  Follow-up not needed.              Vitals Value Taken Time   /80 09/16/24 1002   Temp 36.5 °C (97.7 °F) 09/16/24 0910   Pulse 79 09/16/24 1002   Resp 14 09/16/24 1002   SpO2 100 % 09/16/24 1002   Vitals shown include unfiled device data.      No case tracking events are documented in the log.      Pain/Jose Ramon Score: Pain Rating Prior to Med Admin: 7 (9/16/2024  9:48 AM)  Jose Ramon Score: 8 (9/16/2024 10:01 AM)

## 2024-09-16 NOTE — BRIEF OP NOTE
[facility] - Periop Services  Brief Operative Note    Surgery Date: 9/16/2024     Surgeons and Role:     * Yury Narvaez MD  Assisting Surgeon:      * Everett Isaac MD    Private assist: GIULIANA Jenkins    Pre-op Diagnosis:  Morbid obesity with BMI of 45.0-49.9, adult [E66.01, Z68.42]    Post-op Diagnosis:  Post-Op Diagnosis Codes:     * Morbid obesity with BMI of 45.0-49.9, adult [E66.01, Z68.42]    Procedure(s) (LRB):  GASTRECTOMY, SLEEVE, LAPAROSCOPIC (N/A)    Anesthesia: General    Operative Findings: dictated per surgeon     Estimated Blood Loss: 20 cc         Specimens:   Specimen (24h ago, onward)       Start     Ordered    09/16/24 0820  Specimen to Pathology  RELEASE UPON ORDERING        References:    Click here for ordering Quick Tip   Question:  Release to patient  Answer:  Immediate    09/16/24 0820

## 2024-09-17 VITALS
HEART RATE: 72 BPM | TEMPERATURE: 98 F | OXYGEN SATURATION: 100 % | WEIGHT: 290.38 LBS | RESPIRATION RATE: 18 BRPM | SYSTOLIC BLOOD PRESSURE: 158 MMHG | DIASTOLIC BLOOD PRESSURE: 83 MMHG | HEIGHT: 68 IN | BODY MASS INDEX: 44.01 KG/M2

## 2024-09-17 LAB
ABORH RETYPE: NORMAL
ANION GAP SERPL CALC-SCNC: 10 MEQ/L
BASOPHILS # BLD AUTO: 0.01 X10(3)/MCL
BASOPHILS NFR BLD AUTO: 0.1 %
BUN SERPL-MCNC: 20.9 MG/DL (ref 8.4–25.7)
CALCIUM SERPL-MCNC: 8.8 MG/DL (ref 8.4–10.2)
CHLORIDE SERPL-SCNC: 109 MMOL/L (ref 98–107)
CO2 SERPL-SCNC: 22 MMOL/L (ref 22–29)
CREAT SERPL-MCNC: 1.44 MG/DL (ref 0.73–1.18)
CREAT/UREA NIT SERPL: 15
EOSINOPHIL # BLD AUTO: 0 X10(3)/MCL (ref 0–0.9)
EOSINOPHIL NFR BLD AUTO: 0 %
ERYTHROCYTE [DISTWIDTH] IN BLOOD BY AUTOMATED COUNT: 12.4 % (ref 11.5–17)
GFR SERPLBLD CREATININE-BSD FMLA CKD-EPI: 59 ML/MIN/1.73/M2
GLUCOSE SERPL-MCNC: 99 MG/DL (ref 74–100)
HCT VFR BLD AUTO: 35 % (ref 42–52)
HGB BLD-MCNC: 12.4 G/DL (ref 14–18)
IMM GRANULOCYTES # BLD AUTO: 0.04 X10(3)/MCL (ref 0–0.04)
IMM GRANULOCYTES NFR BLD AUTO: 0.4 %
LYMPHOCYTES # BLD AUTO: 0.8 X10(3)/MCL (ref 0.6–4.6)
LYMPHOCYTES NFR BLD AUTO: 8.7 %
MCH RBC QN AUTO: 30 PG (ref 27–31)
MCHC RBC AUTO-ENTMCNC: 35.4 G/DL (ref 33–36)
MCV RBC AUTO: 84.7 FL (ref 80–94)
MONOCYTES # BLD AUTO: 0.77 X10(3)/MCL (ref 0.1–1.3)
MONOCYTES NFR BLD AUTO: 8.4 %
NEUTROPHILS # BLD AUTO: 7.55 X10(3)/MCL (ref 2.1–9.2)
NEUTROPHILS NFR BLD AUTO: 82.4 %
NRBC BLD AUTO-RTO: 0 %
PLATELET # BLD AUTO: 226 X10(3)/MCL (ref 130–400)
PMV BLD AUTO: 9.9 FL (ref 7.4–10.4)
POTASSIUM SERPL-SCNC: 4.3 MMOL/L (ref 3.5–5.1)
PSYCHE PATHOLOGY RESULT: NORMAL
RBC # BLD AUTO: 4.13 X10(6)/MCL (ref 4.7–6.1)
SODIUM SERPL-SCNC: 141 MMOL/L (ref 136–145)
WBC # BLD AUTO: 9.17 X10(3)/MCL (ref 4.5–11.5)

## 2024-09-17 PROCEDURE — 94799 UNLISTED PULMONARY SVC/PX: CPT

## 2024-09-17 PROCEDURE — 94760 N-INVAS EAR/PLS OXIMETRY 1: CPT

## 2024-09-17 PROCEDURE — 80048 BASIC METABOLIC PNL TOTAL CA: CPT | Performed by: NURSE PRACTITIONER

## 2024-09-17 PROCEDURE — 85025 COMPLETE CBC W/AUTO DIFF WBC: CPT | Performed by: NURSE PRACTITIONER

## 2024-09-17 PROCEDURE — 36415 COLL VENOUS BLD VENIPUNCTURE: CPT | Performed by: NURSE PRACTITIONER

## 2024-09-17 PROCEDURE — 25000003 PHARM REV CODE 250: Performed by: NURSE PRACTITIONER

## 2024-09-17 PROCEDURE — 63600175 PHARM REV CODE 636 W HCPCS: Performed by: NURSE PRACTITIONER

## 2024-09-17 RX ADMIN — ACETAMINOPHEN 1000 MG: 10 INJECTION, SOLUTION INTRAVENOUS at 07:09

## 2024-09-17 RX ADMIN — KETOROLAC TROMETHAMINE 30 MG: 30 INJECTION, SOLUTION INTRAMUSCULAR; INTRAVENOUS at 05:09

## 2024-09-17 RX ADMIN — PANTOPRAZOLE SODIUM 40 MG: 40 TABLET, DELAYED RELEASE ORAL at 09:09

## 2024-09-17 RX ADMIN — ALLOPURINOL 100 MG: 100 TABLET ORAL at 09:09

## 2024-09-17 RX ADMIN — HEPARIN SODIUM 5000 UNITS: 5000 INJECTION, SOLUTION INTRAVENOUS; SUBCUTANEOUS at 09:09

## 2024-09-17 RX ADMIN — LISINOPRIL 40 MG: 20 TABLET ORAL at 09:09

## 2024-09-17 RX ADMIN — SODIUM CHLORIDE, POTASSIUM CHLORIDE, SODIUM LACTATE AND CALCIUM CHLORIDE: 600; 310; 30; 20 INJECTION, SOLUTION INTRAVENOUS at 09:09

## 2024-09-17 NOTE — PLAN OF CARE
Problem: Adult Inpatient Plan of Care  Goal: Plan of Care Review  9/17/2024 1222 by Genesis Velasquez RN  Outcome: Met  9/17/2024 1216 by Genesis Velasquez RN  Outcome: Progressing  Goal: Patient-Specific Goal (Individualized)  9/17/2024 1222 by Genesis Velasquez RN  Outcome: Met  9/17/2024 1216 by Genesis Velasquez RN  Outcome: Progressing  Goal: Absence of Hospital-Acquired Illness or Injury  9/17/2024 1222 by Genesis Velasquez RN  Outcome: Met  9/17/2024 1216 by Genesis Velasquez RN  Outcome: Progressing  Goal: Optimal Comfort and Wellbeing  9/17/2024 1222 by Genesis Velasquez RN  Outcome: Met  9/17/2024 1216 by Genesis Velasquez RN  Outcome: Progressing  Goal: Readiness for Transition of Care  9/17/2024 1222 by Genesis Velasquez, RN  Outcome: Met  9/17/2024 1216 by Genesis Velasquez RN  Outcome: Progressing     Problem: Bariatric Environmental Safety  Goal: Safety Maintained with Care  9/17/2024 1222 by Genesis Velasquez, RN  Outcome: Met  9/17/2024 1216 by Genesis Velasquez RN  Outcome: Progressing     Problem: Wound  Goal: Optimal Coping  9/17/2024 1222 by Genesis Velasquez, RN  Outcome: Met  9/17/2024 1216 by Genesis Velasquez, RN  Outcome: Progressing  Goal: Optimal Functional Ability  9/17/2024 1222 by Genesis Velasquez RN  Outcome: Met  9/17/2024 1216 by Genesis Velasquez, RN  Outcome: Progressing  Goal: Absence of Infection Signs and Symptoms  9/17/2024 1222 by Genesis Velasquez, RN  Outcome: Met  9/17/2024 1216 by Genesis Velasquez RN  Outcome: Progressing  Goal: Improved Oral Intake  9/17/2024 1222 by Genesis Velasquez, RN  Outcome: Met  9/17/2024 1216 by Genesis Velasquez, RN  Outcome: Progressing  Goal: Optimal Pain Control and Function  9/17/2024 1222 by Genesis Velasquez, RN  Outcome: Met  9/17/2024 1216 by Genesis Velasquez RN  Outcome: Progressing  Goal: Skin Health and Integrity  9/17/2024 1222 by Genesis Velasquez, RN  Outcome: Met  9/17/2024 1216 by Genesis Velasquez,  RN  Outcome: Progressing  Goal: Optimal Wound Healing  9/17/2024 1222 by Genesis Velasquez RN  Outcome: Met  9/17/2024 1216 by Genesis Velasquez RN  Outcome: Progressing     Problem: Fall Injury Risk  Goal: Absence of Fall and Fall-Related Injury  9/17/2024 1222 by Genesis Velasquez RN  Outcome: Met  9/17/2024 1216 by Genesis Velasquez RN  Outcome: Progressing     Problem: Pain Acute  Goal: Optimal Pain Control and Function  9/17/2024 1222 by Genesis Velasquez RN  Outcome: Met  9/17/2024 1216 by Genesis Velasquez RN  Outcome: Progressing

## 2024-09-17 NOTE — PROGRESS NOTES
Date of education: 9/17/24  Pt education type: []Pre op  [x]Post op  Surgery date: 9/16/24  Type of surgery: sleeve gastrectomy     Education was provided on:   [x]Importance of protein and vitamin protocol  [x]Importance of drinking  fl oz/day of non carbonated sugar free non caffeinated beverages  [x]Importance of following dietary protocol  [x]Importance of ambulation postop   [x]Use of incentive spirometer 10 times an hour while awake  [x]Non opiod pain management post op   [x]Discontinuing use of meds containing aspirin, ibuprofen, NSAIDs, post op  [x]Signs and symptoms of immediate and long term complications post-op  [x]Prevention and signs and symptoms of blood clots   [x]Prevention and signs of infection  [x]Reviewed medication regimen  [x]Importance of adhering to behavioral changes  [x]Importance of following exercise protocol      Barriers to learning:  [x]None evident  []Acuity of illness  []Cognitive defects  []Cultural barriers  []Desire/Motivation  []Difficulty concentrating  []Emotional state  []Financial concerns  []Hearing deficit  []Language barrier  []Literacy  []Memory problems  []Vision impairment     Teaching methods:  []Demonstration  [x]Explanation  []Printed materials  [x]Teach back  []Virtual/web based    Expected Compliance:  [x]Good  []Fair  []Poor    Additional Notes:  Reviewed above post-op protocols with patient. He was able to recite post-op protocols with some assistance. Reiterated the importance of following the post-op dietary and behavioral guidelines to prevent complications.

## 2024-09-17 NOTE — DISCHARGE SUMMARY
[facility] - 8th Floor Med Surg  General Surgery  Discharge Summary      Patient Name: Deion Meza  MRN: [id number]  Admission Date: 9/16/2024  Hospital Length of Stay: 1 days  Discharge Date and Time:  09/17/2024 9:38 AM  Attending Physician: Yury Narvaez MD   Discharging Provider: GIULIANA Jenkins  Primary Care Provider: Tl Santana MD       Admit and Discharge Diagnosis:   Morbid Obesity BMI 44.8    Procedure: Laparoscopic Vertical Sleeve Gastrectomy per Dr. Yury Narvaez 9/16/2024    Hospital Course:  Mr. Deion Meza is a 49 y.o. male who was admitted for an elective bariatric procedure. Procedure performed as stated above. Upon completion of procedure, pt was transferred from the recovery room to the post surgical floor for further care and treatment. POD#1, afebrile, vital signs stable. The pt's diet was advanced to bariatric clear liquids.     Review of Systems: Mild incisional pain reported but tolerable. No nausea, vomiting, dysphagia, GERD. Patient ambulating in the room/hallway and voiding without difficulty. Pt denies any dizziness, palpitations, SOB, or CP. All other review of systems are negative.     Physical Examination:   Vital signs: stable, noted in chart  General: Awake and alert, able to answer all questions.   Respiratory:  Clear to auscultation bilaterally  Cardio: Regular rate and rhythm.  Abdomen: Soft, non distended. Bowel sounds present to all 4 quadrants. Lap sites clean, dry, and intact. Appropriate point tenderness to lap sites. Abdomen benign.   Neuro: Alert and oriented x's 4.    Consults:   Consults (From admission, onward)          Status Ordering Provider     Inpatient consult to Registered Dietitian/Nutritionist  Once        Provider:  (Not yet assigned)    Acknowledged GRAHAM BALDERRAMA            Significant Diagnostic Studies: Labs: BMP:   Recent Labs   Lab 09/17/24  0410      K 4.3   *   CO2 22   BUN 20.9   CREATININE  1.44*   CALCIUM 8.8    and CBC   Recent Labs   Lab 09/17/24  0410   WBC 9.17   HGB 12.4*   HCT 35.0*          Pending Diagnostic Studies:       Procedure Component Value Units Date/Time    Specimen to Pathology [[id number]] Collected: 09/16/24 0820    Order Status: Sent Lab Status: In process Updated: 09/16/24 1059    Specimen: Tissue from Stomach           Final Active Diagnoses:    Diagnosis Date Noted POA    PRINCIPAL PROBLEM:  Morbid obesity with BMI of 45.0-49.9, adult [E66.01, Z68.42] 08/27/2024 Not Applicable      Problems Resolved During this Admission:      Discharged Condition: good    Disposition:     Follow Up:   Follow-up Information       Yury Narvaez MD. Go in 2 week(s).    Specialty: General Surgery  Why: Your first year of follow up appointments are scheduled and in Epic.  Contact information:  [address] [address] [phone]                           Patient Instructions:   No discharge procedures on file.  Medications:  Reconciled Home Medications:      Medication List        START taking these medications      acetaminophen 500 MG tablet  Commonly known as: TYLENOL  Take 2 tablets (1,000 mg total) by mouth every 8 (eight) hours. for 3 days     ketorolac 10 mg tablet  Commonly known as: TORADOL  Take 1 tablet (10 mg total) by mouth every 6 (six) hours. for 3 days     ondansetron 4 MG Tbdl  Commonly known as: ZOFRAN-ODT  Take 1 tablet (4 mg total) by mouth every 6 (six) hours as needed (first line med prn NV).     pantoprazole 40 MG tablet  Commonly known as: PROTONIX  Take 1 tablet (40 mg total) by mouth once daily. Take once daily for 30 days post op     promethazine 12.5 MG Tab  Commonly known as: PHENERGAN  Take 1 tablet (12.5 mg total) by mouth every 6 (six) hours as needed (second line med prn NV).            CONTINUE taking these medications      allopurinoL 100 MG tablet  Commonly known as: ZYLOPRIM  Take by mouth once daily.     atorvastatin 40 MG  tablet  Commonly known as: LIPITOR  Take 40 mg by mouth every evening.     lisinopriL 40 MG tablet  Commonly known as: PRINIVIL,ZESTRIL  Take 40 mg by mouth once daily.            ASK your doctor about these medications      indomethacin 50 MG capsule  Commonly known as: INDOCIN  TAKE 1 CAPSULE BY MOUTH IN THE MORNING AT NOON AND AT BEDTIME FOR 10 DAYS     testosterone cypionate 200 mg/mL injection  Commonly known as: DEPOTESTOTERONE CYPIONATE  SMARTSI.5 Milliliter(s) IM Every 2 Weeks          Labs:  Unremarkable, reviewed.    Impression and Plan:     Pt POD#1 status post Lap sleeve gastrectomy is progressing well per bariatric protocol. We will plan to prepare for discharge this afternoon once tolerating bariatric clears.   - Continue ambulation in [place]  - Continue IS  - Continue IV fluids  - Resume home meds as appropriate  - Discharge instructions reviewed with patient and family. All questions answered.   - FU 2 weeks in office with Dr. Solange Horta, ANP  General Surgery  [facility] - 8th Floor Med Surg

## 2024-09-17 NOTE — NURSING
Nurses Note -- 4 Eyes      9/17/2024   7:45 AM      Skin assessed during: Q Shift Change      [x] No Altered Skin Integrity Present    []Prevention Measures Documented  Surgical incisions present    [] Yes- Altered Skin Integrity Present or Discovered   [] LDA Added if Not in Epic (Describe Wound)   [] New Altered Skin Integrity was Present on Admit and Documented in LDA   [] Wound Image Taken    Wound Care Consulted? No    Attending Nurse:  Genesis FAUSTIN RN    Second RN/Staff Member:   Sariah ARVIZU RN

## 2024-09-17 NOTE — PLAN OF CARE
Problem: Adult Inpatient Plan of Care  Goal: Plan of Care Review  Outcome: Progressing  Goal: Patient-Specific Goal (Individualized)  Outcome: Progressing  Goal: Absence of Hospital-Acquired Illness or Injury  Outcome: Progressing  Goal: Optimal Comfort and Wellbeing  Outcome: Progressing  Goal: Readiness for Transition of Care  Outcome: Progressing     Problem: Bariatric Environmental Safety  Goal: Safety Maintained with Care  Outcome: Progressing     Problem: Wound  Goal: Optimal Coping  Outcome: Progressing  Goal: Optimal Functional Ability  Outcome: Progressing  Goal: Absence of Infection Signs and Symptoms  Outcome: Progressing  Goal: Improved Oral Intake  Outcome: Progressing  Goal: Optimal Pain Control and Function  Outcome: Progressing  Goal: Skin Health and Integrity  Outcome: Progressing  Goal: Optimal Wound Healing  Outcome: Progressing     Problem: Fall Injury Risk  Goal: Absence of Fall and Fall-Related Injury  Outcome: Progressing

## 2024-09-17 NOTE — NURSING
DC note: Pt educated on care after surgery and d/c instructions.  NAD noted.  All questions answered.  F/u appts given to pt.  Meds called to pharmacy.

## 2024-09-17 NOTE — PLAN OF CARE
Problem: Adult Inpatient Plan of Care  Goal: Plan of Care Review  Outcome: Progressing  Goal: Patient-Specific Goal (Individualized)  Outcome: Progressing  Goal: Absence of Hospital-Acquired Illness or Injury  Outcome: Progressing  Goal: Optimal Comfort and Wellbeing  Outcome: Progressing  Goal: Readiness for Transition of Care  Outcome: Progressing     Problem: Bariatric Environmental Safety  Goal: Safety Maintained with Care  Outcome: Progressing     Problem: Wound  Goal: Optimal Coping  Outcome: Progressing  Goal: Optimal Functional Ability  Outcome: Progressing  Goal: Absence of Infection Signs and Symptoms  Outcome: Progressing  Goal: Improved Oral Intake  Outcome: Progressing  Goal: Optimal Pain Control and Function  Outcome: Progressing  Goal: Skin Health and Integrity  Outcome: Progressing  Goal: Optimal Wound Healing  Outcome: Progressing     Problem: Fall Injury Risk  Goal: Absence of Fall and Fall-Related Injury  Outcome: Progressing     Problem: Pain Acute  Goal: Optimal Pain Control and Function  Outcome: Progressing

## 2024-09-17 NOTE — CONSULTS
"  [facility] - 8th Floor Med Surg  Adult Nutrition  Consult Note    SUMMARY     Recommendations    Recommendation/Intervention: Pt to continue nutritional monitoring and education in bariatric clinic per protocol  Goals: Understanding of dietary guidelines and tolerance to diet.  Nutrition Goal Status: goal met  Communication of RD Recs: discussed on rounds    Assessment and Plan    No new Assessment & Plan notes have been filed under this hospital service since the last note was generated.  Service: Nutrition       Malnutrition Assessment                                       Reason for Assessment    Reason For Assessment: consult  Diagnosis: other (see comments) (s/p VSG)  Relevant Medical History: morbid obesity, high cholesterol, HTN, NATHANIEL  Interdisciplinary Rounds: attended  General Information Comments: Pt was able to recite nutritional guidelines for home discharge.  Nutrition Discharge Planning: Pt to remain on bariatric clear liquids through discharge and progress home diet per protocol.    Nutrition Risk Screen    Nutrition Risk Screen: no indicators present    Nutrition/Diet History    Spiritual, Cultural Beliefs, Confucianist Practices, Values that Affect Care: no  Factors Affecting Nutritional Intake: decreased appetite, clear liquid diet, early satiety (as expected)    Anthropometrics    Temp: 98.1 °F (36.7 °C)  Height Method: Stated  Height: 5' 7.5" (171.5 cm)  Height (inches): 67.5 in  Weight Method: Standard Scale  Weight: 131.7 kg (290 lb 5.5 oz)  Weight (lb): 290.35 lb  Ideal Body Weight (IBW), Male: 151 lb  % Ideal Body Weight, Male (lb): 192.28 %  BMI (Calculated): 44.8  BMI Grade: greater than 40 - morbid obesity  Weight Loss: intentional  Usual Body Weight (UBW), k.6 kg  % Usual Body Weight: 97.33  % Weight Change From Usual Weight: -2.88 %       Lab/Procedures/Meds         Physical Findings/Assessment         Estimated/Assessed Needs    Weight Used For Calorie Calculations: " 86 kg (189 lb 9.5 oz)  Energy Calorie Requirements (kcal): 5404-2140 kcals/d  Energy Need Method: Kcal/kg  Protein Requirements: 86-94g/d  Weight Used For Protein Calculations: 86 kg (189 lb 9.5 oz)  Fluid Requirements (mL): 2634 ml/d  Estimated Fluid Requirement Method: other (see comments) (20 ml/kg actual BW/d)  RDA Method (mL): 1720         Nutrition Prescription Ordered    Current Diet Order: Bariatric clear liquids  Nutrition Order Comments: Pt to remain on bariatric clears through discharge and progress home diet per protocol.    Evaluation of Received Nutrient/Fluid Intake    Energy Calories Required: not meeting needs (as expected)  Protein Required: not meeting needs (as expected)  Fluid Required: meeting needs  Tolerance: tolerating  % Intake of Estimated Energy Needs: 0 - 25 %  % Meal Intake: 0 - 25 %    Nutrition Risk    Level of Risk/Frequency of Follow-up: low       Monitor and Evaluation    Food and Nutrient Intake: energy intake  Food and Nutrient Adminstration: diet order  Knowledge/Beliefs/Attitudes: food and nutrition knowledge/skill  Anthropometric Measurements: weight, body mass index, weight change       Nutrition Follow-Up    RD Follow-up?: Yes (bariatric clinic follow up)

## 2024-09-17 NOTE — NURSING
Nurses Note -- 4 Eyes      9/16/2024  06:55 PM      Skin assessed during: Q Shift Change      [x] No Altered Skin Integrity Present    []Prevention Measures Documented      [] Yes- Altered Skin Integrity Present or Discovered   [] LDA Added if Not in Epic (Describe Wound)   [] New Altered Skin Integrity was Present on Admit and Documented in LDA   [] Wound Image Taken    Wound Care Consulted? No    Attending Nurse:  Sariah RODRIGUEZ RN    Second RN/Staff Member:   TERESA Frances

## 2024-09-24 ENCOUNTER — TELEPHONE (OUTPATIENT)
Dept: BARIATRICS | Facility: HOSPITAL | Age: 49
End: 2024-09-24
Payer: COMMERCIAL

## 2024-09-24 DIAGNOSIS — Z98.84 HISTORY OF BARIATRIC SURGERY: Primary | ICD-10-CM

## 2024-09-24 DIAGNOSIS — E66.01 MORBID OBESITY WITH BMI OF 40.0-44.9, ADULT: Primary | ICD-10-CM

## 2024-09-24 NOTE — TELEPHONE ENCOUNTER
Date call was completed: 9/24/24  Date of Surgery: 9/16/24  Surgery type: sleeve gastrectomy    Are you drinking the recommended amount of water (73-100oz) a day? [x]  Yes        []  No  If not, how may ounces of water are you drinking?     Are you drinking the recommended amount of protein a day?(recommendations base on individual goal) []  Yes        [x]  No  If not, how many grams of protein are you drinking?    Are you taking the recommended supplements that were discussed in pre-op class?  []  Yes   [x] No  Multivitamin [x]  Yes        []  No  Calcium Supplement [x]  Yes        []  No  Iron [x]  Yes        []  No  Miralax [x]  Yes        []  No    Are you walking at least 20 minutes a day for exercise? [x]  Yes   [] No    Have you resumed your home medications that you were instructed to resume? [x]  Yes   [] No    Do you have a follow-up appt scheduled with your family doctor or doctor treating you for you co-morb conditions within the week? []  Yes   [x] No  *Pt advised to schedule with PCP. Pt monitoring BP daily at home.    Are you taking the Protonix (at night) that was prescribed to you in the hospital? []  Yes   [x] No;  Pt has not picked up protonix as he thought it was for pain and he doesn't need anything for pain. Pt was advised to fill prescription for this ASAP and take daily for 30 days.    Are you showering daily with an antibacterial soap?  [x]  Yes   [] No    Have you had a bowel movement since surgery? [x]  Yes   [] No

## 2024-10-01 ENCOUNTER — OFFICE VISIT (OUTPATIENT)
Dept: SURGERY | Facility: CLINIC | Age: 49
End: 2024-10-01
Payer: COMMERCIAL

## 2024-10-01 ENCOUNTER — CLINICAL SUPPORT (OUTPATIENT)
Dept: BARIATRICS | Facility: HOSPITAL | Age: 49
End: 2024-10-01

## 2024-10-01 VITALS
BODY MASS INDEX: 41.07 KG/M2 | HEIGHT: 68 IN | DIASTOLIC BLOOD PRESSURE: 84 MMHG | HEART RATE: 60 BPM | WEIGHT: 269.88 LBS | BODY MASS INDEX: 41.65 KG/M2 | SYSTOLIC BLOOD PRESSURE: 137 MMHG | WEIGHT: 271 LBS

## 2024-10-01 DIAGNOSIS — Z13.0 SCREENING FOR IRON DEFICIENCY ANEMIA: ICD-10-CM

## 2024-10-01 DIAGNOSIS — Z91.89 ELECTROLYTE IMBALANCE RISK: ICD-10-CM

## 2024-10-01 DIAGNOSIS — E66.01 MORBID OBESITY WITH BMI OF 40.0-44.9, ADULT: Primary | ICD-10-CM

## 2024-10-01 DIAGNOSIS — Z13.21 ENCOUNTER FOR VITAMIN DEFICIENCY SCREENING: ICD-10-CM

## 2024-10-01 DIAGNOSIS — Z98.84 HISTORY OF BARIATRIC SURGERY: Primary | ICD-10-CM

## 2024-10-01 DIAGNOSIS — Z48.89 ENCOUNTER FOR POSTOPERATIVE CARE: ICD-10-CM

## 2024-10-01 DIAGNOSIS — Z90.3 HISTORY OF SLEEVE GASTRECTOMY: ICD-10-CM

## 2024-10-01 PROCEDURE — 3079F DIAST BP 80-89 MM HG: CPT | Mod: CPTII,,, | Performed by: SURGERY

## 2024-10-01 PROCEDURE — 3075F SYST BP GE 130 - 139MM HG: CPT | Mod: CPTII,,, | Performed by: SURGERY

## 2024-10-01 PROCEDURE — 99024 POSTOP FOLLOW-UP VISIT: CPT | Mod: ,,, | Performed by: SURGERY

## 2024-10-01 PROCEDURE — 1159F MED LIST DOCD IN RCRD: CPT | Mod: CPTII,,, | Performed by: SURGERY

## 2024-10-01 PROCEDURE — 4010F ACE/ARB THERAPY RXD/TAKEN: CPT | Mod: CPTII,,, | Performed by: SURGERY

## 2024-10-01 RX ORDER — LANOLIN ALCOHOL/MO/W.PET/CERES
1 CREAM (GRAM) TOPICAL
COMMUNITY

## 2024-10-01 NOTE — PROGRESS NOTES
[facility] - General Surgery Services  [address] [place] [id number]  Phone: [phone]  Fax: [phone]    [facility]  Post op Bariatric Note  Dr. Yury Narvaez      Patient: Deion Meza  Date: 10/01/2024   Author: Mariam Horta ANP    Chief Complaint:   Chief Complaint   Patient presents with    Post-op Evaluation     [facility] 09/16/24- 2 week fu (Medicaid)       History of Present Illness:  Mr. Deion Meza is a 49 y.o. male who is 2 weeks s/p Lap Sleeve Gastrectomy on 9/16/24 by Dr. Yury Narvaez.   Pathology benign.   Presents today for routine 2 week follow up appt.   No complaints. No dysphagia, odynophagia, GERD, NV, or abd pain. Regular BMs.     Diet: compliant with bariatric meal plan, tolerating bariatric clears  Exercise: regular exercise, walking   Vitamins: compliant with bariatric supplementation per protocol    Ht: 67.5 in.  Weights:   Trend Weights BMI   Starting 293# 45   Pre-Op 290# 44   2 Week 271 #  41.82   2 Month       6 Month        1 Year       2 Year                                Pertinent History:  HTN - [x] Yes   [] No    [] Resolved  HLD - [x] Yes   [] No    [] Resolved  DM  -  [] Yes   [x] No    [] Resolved  NATHANIEL - [x] Yes   [] No   [] Resolved  GERD - [] Yes   [x] No [] Resolved  Anticoagulation- [] Yes   [x] No      If yes, type: [] ASA [] Plavix [] Other    Review of Systems:    12 point review of systems conducted, negative except as stated in the history of present illness. See HPI for details.    Review of patient's allergies indicates:  No Known Allergies  Past Medical History:   Diagnosis Date    High cholesterol     Hypertension     Kidney stone     Morbid obesity with BMI of 45.0-49.9, adult     Skin cancer     Sleep apnea     uses CPAP     Past Surgical History:   Procedure Laterality Date    COLONOSCOPY      ESOPHAGOGASTRODUODENOSCOPY N/A 8/30/2024    Procedure: EGD  "(ESOPHAGOGASTRODUODENOSCOPY);  Surgeon: Yury Narvaez MD;  Location: [facility] OR;  Service: General;  Laterality: N/A;    EYE SURGERY      LAPAROSCOPIC SLEEVE GASTRECTOMY N/A 2024    Procedure: GASTRECTOMY, SLEEVE, LAPAROSCOPIC;  Surgeon: Yury Narvaez MD;  Location: [facility] OR;  Service: General;  Laterality: N/A;  WITH AUTIN    NASAL SINUS SURGERY      Skin cancer removed      x5     Family History   Problem Relation Name Age of Onset    Hyperlipidemia Mother Alysa Meza     Hypertension Father Rodrigo Meza     Heart disease Father Rodrigo Meza     Kidney failure Paternal Grandmother Jada Meza      Social History     Tobacco Use    Smoking status: Never    Smokeless tobacco: Never   Substance Use Topics    Alcohol use: Never    Drug use: Never      Current Outpatient Medications   Medication Instructions    allopurinoL (ZYLOPRIM) 100 MG tablet Daily    atorvastatin (LIPITOR) 40 mg, Nightly    calcium carbonate/vitamin D3 (CALTRATE 600 + D ORAL) Take by mouth.    ferrous sulfate (FEOSOL) Tab tablet 1 tablet, With breakfast    indomethacin (INDOCIN) 50 MG capsule TAKE 1 CAPSULE BY MOUTH IN THE MORNING AT NOON AND AT BEDTIME FOR 10 DAYS    lisinopriL (PRINIVIL,ZESTRIL) 40 mg, Daily    ondansetron (ZOFRAN-ODT) 4 mg, Oral, Every 6 hours PRN    pantoprazole (PROTONIX) 40 mg, Oral, Daily, Take once daily for 30 days post op    pediatric multivitamin no.76 (FLINTSTONES COMPLETE ORAL) Take by mouth.    promethazine (PHENERGAN) 12.5 mg, Oral, Every 6 hours PRN    testosterone cypionate (DEPOTESTOTERONE CYPIONATE) 200 mg/mL injection SMARTSI.5 Milliliter(s) IM Every 2 Weeks       Objective:     Vital Signs (Most Recent):  Visit Vitals  /84   Pulse 60   Ht 5' 7.5" (1.715 m)   Wt 122.9 kg (271 lb)   BMI 41.82 kg/m²       Physical Exam:  General: Alert and oriented, No acute distress.  Respiratory: Clear to auscultation bilaterally; No wheezes, rales or rhonchi,Non-labored respirations  Cardiovascular: " Regular rate and rhythm   Gastrointestinal: Abd soft, Non-tender, Non-distended, Bowel sounds present, Lap sites clean dry and intact, no evidence of infection.   Musculoskeletal: Normal range of motion.  Integumentary: Warm, Dry, Intact  Neurologic: No focal deficits  Psychiatric: Appropriate affect    Assessment and Plan:       ICD-10-CM ICD-9-CM   1. Morbid obesity with BMI of 40.0-44.9, adult  E66.01 278.01    Z68.41 V85.41   2. Encounter for vitamin deficiency screening  Z13.21 V77.99   3. Screening for iron deficiency anemia  Z13.0 V78.0   4. Electrolyte imbalance risk  Z91.89 V49.89   5. Encounter for postoperative care  Z48.89 V58.49   6. History of sleeve gastrectomy  Z90.3 V15.29       2 weeks post op s/p Laparoscopic Sleeve Gastrectomy.     - Follow up in 6 weeks with bariatric labs  - Continue diet per bariatric protocol  - Exercise encouraged  - Continue vitamin supplementation  - Support group attendance encouraged  - Keep routine appts with PCP/specialists as scheduled  - Dr. Narvaez examined patient, discussed recommendations, and answered all questions.     GIULIANA Jenkins    I, GIULIANA Benavides, am scribing for, and in the presence of, Yury Narvaez MD, performed the above scribed service and the documentation accurately describes the services I performed. I attest to the accuracy of the note.

## 2024-10-01 NOTE — PROGRESS NOTES
"POST OP BARIATRIC NUTRITION FOLLOW UP    Type of surgery: sleeve gastrectomy  Post-op visit:   [x] 2 weeks  [] 4 weeks:  [] 2 months:  [] 4 months:  [] 6 months:  [] 9 months:  [] 1 year:   [] Other:     Height:   Ht Readings from Last 1 Encounters:   09/17/24 5' 7.5" (1.715 m)      Weight:   Wt Readings from Last 3 Encounters:   10/01/24 1306 122.4 kg (269 lb 14.4 oz)   09/17/24 1147 131.7 kg (290 lb 5.5 oz)   09/16/24 1755 131.7 kg (290 lb 5.5 oz)   08/30/24 0916 135.6 kg (299 lb)   09/09/24 0838 131.7 kg (290 lb 6.4 oz)      BMI:   BMI Readings from Last 1 Encounters:   10/01/24 41.65 kg/m²       Post op complications:   [] Yes [x] No  If yes: [] Nausea   [] Vomiting   [] Constipation    [] Diarrhea    [] Other:     Dietary tolerance:  [x] Yes [] No [] Comment:     Adequate fluid intake:  [x] Yes [] No Approx. daily fluid intake:  oz  Adequate protein intake:  [x] Yes [] No  Approx. daily protein intake: 90 g    Vitamins/Minerals:   [x] MVI:    [] Biotin:  [x] Calcium:    [] Hair/Nails:  [] B 50 complex:   [] Bariatric Specific MVI:  [] B12:    [] Bariatric Specific Calcium:  [x] Iron:    [] Other:   [] Non-compliance:      Expected compliance:  [x]Good  []Fair  []Poor    Progress:   [x]Patient progressing well at this time with no complaints   [] Patient expressed complaint at this time: See additional notes       Bariatric Diet Education:   Verbalizes understanding Demonstrates  Needs further teaching Needs practice/ supervision Comments    Bariatric Clear [] [] [] []    Bariatric Full [] [] [] []    Bariatric Puree [x] [] [] []    Bariatric Soft [x] [] [] []    Bariatric Regular [] [] [] []    Bariatric Reintroduction of Starchy CHO [] [] [] []    Bariatric: Mindful Eating [] [] [] []    Importance of Protein and Vitamin Protocol [] [] [] []    Other:            Additional Notes:   Educated pt on pureed diet and soft diet. Pt will start pureed diet tomorrow and then progress to soft diet on 10/15/24 if " he is tolerating pureed diet well. Informed him to restrict starches until 6 months post op. Pt is doing well- meeting protein and water goal daily and taking vitamins as rec'd.

## 2024-11-11 ENCOUNTER — LAB VISIT (OUTPATIENT)
Dept: LAB | Facility: HOSPITAL | Age: 49
End: 2024-11-11
Attending: SURGERY
Payer: COMMERCIAL

## 2024-11-11 DIAGNOSIS — Z91.89 ELECTROLYTE IMBALANCE RISK: ICD-10-CM

## 2024-11-11 DIAGNOSIS — Z13.21 ENCOUNTER FOR VITAMIN DEFICIENCY SCREENING: ICD-10-CM

## 2024-11-11 DIAGNOSIS — Z13.0 SCREENING FOR IRON DEFICIENCY ANEMIA: ICD-10-CM

## 2024-11-11 LAB
ALBUMIN SERPL-MCNC: 4.3 G/DL (ref 3.5–5)
ALBUMIN/GLOB SERPL: 1.5 RATIO (ref 1.1–2)
ALP SERPL-CCNC: 76 UNIT/L (ref 40–150)
ALT SERPL-CCNC: 18 UNIT/L (ref 0–55)
ANION GAP SERPL CALC-SCNC: 8 MEQ/L
AST SERPL-CCNC: 18 UNIT/L (ref 5–34)
BASOPHILS # BLD AUTO: 0.04 X10(3)/MCL
BASOPHILS NFR BLD AUTO: 0.5 %
BILIRUB SERPL-MCNC: 0.8 MG/DL
BUN SERPL-MCNC: 19 MG/DL (ref 8.4–25.7)
CALCIUM SERPL-MCNC: 9.6 MG/DL (ref 8.4–10.2)
CHLORIDE SERPL-SCNC: 106 MMOL/L (ref 98–107)
CO2 SERPL-SCNC: 25 MMOL/L (ref 22–29)
CREAT SERPL-MCNC: 1.44 MG/DL (ref 0.72–1.25)
CREAT/UREA NIT SERPL: 13
EOSINOPHIL # BLD AUTO: 0.08 X10(3)/MCL (ref 0–0.9)
EOSINOPHIL NFR BLD AUTO: 1.1 %
ERYTHROCYTE [DISTWIDTH] IN BLOOD BY AUTOMATED COUNT: 12.5 % (ref 11.5–17)
GFR SERPLBLD CREATININE-BSD FMLA CKD-EPI: 59 ML/MIN/1.73/M2
GLOBULIN SER-MCNC: 2.9 GM/DL (ref 2.4–3.5)
GLUCOSE SERPL-MCNC: 79 MG/DL (ref 74–100)
HCT VFR BLD AUTO: 43.4 % (ref 42–52)
HGB BLD-MCNC: 14.9 G/DL (ref 14–18)
IMM GRANULOCYTES # BLD AUTO: 0.02 X10(3)/MCL (ref 0–0.04)
IMM GRANULOCYTES NFR BLD AUTO: 0.3 %
IRON SATN MFR SERPL: 31 % (ref 20–50)
IRON SERPL-MCNC: 72 UG/DL (ref 65–175)
LYMPHOCYTES # BLD AUTO: 1.36 X10(3)/MCL (ref 0.6–4.6)
LYMPHOCYTES NFR BLD AUTO: 18.1 %
MCH RBC QN AUTO: 29.4 PG (ref 27–31)
MCHC RBC AUTO-ENTMCNC: 34.3 G/DL (ref 33–36)
MCV RBC AUTO: 85.8 FL (ref 80–94)
MONOCYTES # BLD AUTO: 0.52 X10(3)/MCL (ref 0.1–1.3)
MONOCYTES NFR BLD AUTO: 6.9 %
NEUTROPHILS # BLD AUTO: 5.51 X10(3)/MCL (ref 2.1–9.2)
NEUTROPHILS NFR BLD AUTO: 73.1 %
NRBC BLD AUTO-RTO: 0 %
PLATELET # BLD AUTO: 280 X10(3)/MCL (ref 130–400)
PMV BLD AUTO: 10.3 FL (ref 7.4–10.4)
POTASSIUM SERPL-SCNC: 4.4 MMOL/L (ref 3.5–5.1)
PROT SERPL-MCNC: 7.2 GM/DL (ref 6.4–8.3)
RBC # BLD AUTO: 5.06 X10(6)/MCL (ref 4.7–6.1)
SODIUM SERPL-SCNC: 139 MMOL/L (ref 136–145)
TIBC SERPL-MCNC: 158 UG/DL (ref 69–240)
TIBC SERPL-MCNC: 230 UG/DL (ref 250–450)
TRANSFERRIN SERPL-MCNC: 202 MG/DL (ref 174–364)
VIT B12 SERPL-MCNC: 531 PG/ML (ref 213–816)
WBC # BLD AUTO: 7.53 X10(3)/MCL (ref 4.5–11.5)

## 2024-11-11 PROCEDURE — 36415 COLL VENOUS BLD VENIPUNCTURE: CPT

## 2024-11-13 LAB — VIT B1 BLD-SCNC: 116 NMOL/L (ref 70–180)

## 2024-11-19 ENCOUNTER — PATIENT MESSAGE (OUTPATIENT)
Dept: SURGERY | Facility: CLINIC | Age: 49
End: 2024-11-19

## 2024-11-19 ENCOUNTER — OFFICE VISIT (OUTPATIENT)
Dept: SURGERY | Facility: CLINIC | Age: 49
End: 2024-11-19
Payer: COMMERCIAL

## 2024-11-19 ENCOUNTER — CLINICAL SUPPORT (OUTPATIENT)
Dept: BARIATRICS | Facility: HOSPITAL | Age: 49
End: 2024-11-19

## 2024-11-19 VITALS
SYSTOLIC BLOOD PRESSURE: 126 MMHG | WEIGHT: 239.81 LBS | DIASTOLIC BLOOD PRESSURE: 80 MMHG | HEIGHT: 68 IN | HEIGHT: 68 IN | HEART RATE: 56 BPM | BODY MASS INDEX: 36.6 KG/M2 | WEIGHT: 241.5 LBS | BODY MASS INDEX: 36.35 KG/M2

## 2024-11-19 DIAGNOSIS — E66.9 OBESITY: Primary | ICD-10-CM

## 2024-11-19 DIAGNOSIS — E66.9 OBESITY, UNSPECIFIED CLASS, UNSPECIFIED OBESITY TYPE, UNSPECIFIED WHETHER SERIOUS COMORBIDITY PRESENT: ICD-10-CM

## 2024-11-19 DIAGNOSIS — Z13.21 ENCOUNTER FOR VITAMIN DEFICIENCY SCREENING: Primary | ICD-10-CM

## 2024-11-19 DIAGNOSIS — Z71.3 DIETARY COUNSELING: ICD-10-CM

## 2024-11-19 DIAGNOSIS — Z90.3 S/P GASTRIC SLEEVE PROCEDURE: ICD-10-CM

## 2024-11-19 DIAGNOSIS — Z91.89 ELECTROLYTE IMBALANCE RISK: ICD-10-CM

## 2024-11-19 DIAGNOSIS — Z71.82 EXERCISE COUNSELING: ICD-10-CM

## 2024-11-19 DIAGNOSIS — Z98.84 HISTORY OF BARIATRIC SURGERY: Primary | ICD-10-CM

## 2024-11-19 DIAGNOSIS — Z71.3 ENCOUNTER FOR WEIGHT LOSS COUNSELING: ICD-10-CM

## 2024-11-19 DIAGNOSIS — Z13.0 SCREENING, ANEMIA, DEFICIENCY, IRON: ICD-10-CM

## 2024-11-19 PROCEDURE — 1159F MED LIST DOCD IN RCRD: CPT | Mod: CPTII,,, | Performed by: NURSE PRACTITIONER

## 2024-11-19 PROCEDURE — 3079F DIAST BP 80-89 MM HG: CPT | Mod: CPTII,,, | Performed by: NURSE PRACTITIONER

## 2024-11-19 PROCEDURE — 3074F SYST BP LT 130 MM HG: CPT | Mod: CPTII,,, | Performed by: NURSE PRACTITIONER

## 2024-11-19 PROCEDURE — 4010F ACE/ARB THERAPY RXD/TAKEN: CPT | Mod: CPTII,,, | Performed by: NURSE PRACTITIONER

## 2024-11-19 PROCEDURE — 99024 POSTOP FOLLOW-UP VISIT: CPT | Mod: ,,, | Performed by: NURSE PRACTITIONER

## 2024-11-19 NOTE — PROGRESS NOTES
A 2 month F/U post op visit was conducted in the office. Patient's current weight is 241 lbs. A body composition and measurements were conducted.  Patient was educated on results. He lost 59 lbs. And 20 inches. Patient is not exercising consistently, but he has increased his ADL's. He was educated on the importance of getting his protein intake daily due to muscle loss.    GOAL:  - To lose another 50 lbs.    PLAN:  - Patient will walk 30 minutes or more 3-5x/week and start strengthening exercises 2x/week.  - Patient will follow dietary advice from Junie May RD.      It is my professional opinion that patient is in the preparation stage of behavior change.    ROSA ISELA Rios, CPT, CHC

## 2024-11-19 NOTE — PROGRESS NOTES
"POST OP BARIATRIC NUTRITION FOLLOW UP    Type of surgery: sleeve gastrectomy  Post-op visit:   [] 2 weeks  [] 4 weeks:  [x] 2 months:  [] 4 months:  [] 6 months:  [] 9 months:  [] 1 year:   [] Other:     Height:   Ht Readings from Last 1 Encounters:   11/19/24 5' 7.5" (1.715 m)      Weight:   Wt Readings from Last 3 Encounters:   11/19/24 1421 109.5 kg (241 lb 8 oz)   10/01/24 1309 122.9 kg (271 lb)   10/01/24 1306 122.4 kg (269 lb 14.4 oz)      BMI:   BMI Readings from Last 1 Encounters:   11/19/24 37.27 kg/m²            Post op complications:   [] Yes [x] No  If yes: [] Nausea   [] Vomiting   [] Constipation    [] Diarrhea    [] Other:     Dietary tolerance:  [] Yes [x] No [] Comment:     Adequate fluid intake:  [x] Yes [] No Approx. daily fluid intake:   Adequate protein intake:  [x] Yes [] No  Approx. daily protein intake:    Vitamins/Minerals:   [x] MVI:    [] Biotin:  [x] Calcium:    [] Hair/Nails:  [] B 50 complex:   [] Bariatric Specific MVI:  [] B12:    [] Bariatric Specific Calcium:  [x] Iron:    [] Other:   [x] Non-compliance:        Labs:   WNL  Comment:    Expected compliance:  [x]Good  []Fair  []Poor      Progress:   [x]Patient progressing well at this time with no complaints   [] Patient expressed complaint at this time: See additional notes       Bariatric Diet Education:   Verbalizes understanding Demonstrates  Needs further teaching Needs practice/ supervision Comments    Bariatric Clear [] [] [] []    Bariatric Full [] [] [] []    Bariatric Puree [] [] [] []    Bariatric Soft [] [] [] []    Bariatric Regular [] [] [] []    Bariatric Reintroduction of Starchy CHO [x] [] [] []    Bariatric: Mindful Eating [] [] [] []    Importance of Protein and Vitamin Protocol [x] [] [] []    Other:            Additional Notes:    Emphasized the importance of remaining compliant on vitamins. Explained risk of deficiency diseases if non-compliant.     "
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
## 2025-01-28 ENCOUNTER — TELEPHONE (OUTPATIENT)
Dept: SURGERY | Facility: CLINIC | Age: 50
End: 2025-01-28
Payer: COMMERCIAL

## 2025-01-28 DIAGNOSIS — R10.11 RUQ ABDOMINAL PAIN: Primary | ICD-10-CM

## 2025-01-28 NOTE — TELEPHONE ENCOUNTER
Placed orders   Reminder set     Per jose alfredo, ronen   Girls   1. Order US, +/- HIDA scan dx abd pain.   2. Let me know when results are back   3. Let pt know to take it easy for now. Watch fatty foods, go to liquids if he needs to if pain is bad. ER precautions.

## 2025-01-28 NOTE — TELEPHONE ENCOUNTER
VIC 9/16/2024  Pt stated it started the last few weeks he's noticed at random sharp stabbing pain to the RUQ. Stated its non food related it happens at random times and can last up to a minute. He's concerned he has a gallstone stuck and that's what causing his pain. He denies N/V, diarrhea or fever. Having normal Bms . Did mention that he started working out so he thought it was pulled muscles but now he's thinking its something else.

## 2025-01-28 NOTE — TELEPHONE ENCOUNTER
"----- Message from Med Assistant Schroeder sent at 1/28/2025 11:04 AM CST -----  Regarding: voicemail  Surgeon:  Dr. Yury Narvaez   Procedure:  Lap Sleeve Gastrectomy   Procedure Date:  9/16/2025  Patient Concerns: Pt left a vm wanting to speak with someone about "sharp pains that are happening regularly"  Call back #: [phone]  "

## 2025-02-19 ENCOUNTER — TELEPHONE (OUTPATIENT)
Dept: SURGERY | Facility: CLINIC | Age: 50
End: 2025-02-19
Payer: COMMERCIAL

## 2025-02-19 NOTE — TELEPHONE ENCOUNTER
Patient cancelled his testing   I called to see if he would like me to get that RS for him he stated that he is not longer having any issues and would like to wait on the testing he will address this at this appt in march for his 6 month follow up

## 2025-02-19 NOTE — TELEPHONE ENCOUNTER
----- Message from Angel Mendez sent at 1/30/2025 11:12 AM CST -----  Regarding: RE: fu on status  Scheduled for 2/18/25   Fu on results  ----- Message -----  From: Vanessa Marquez MA  Sent: 1/30/2025  12:00 AM CST  To: Tico Wadsworth Staff  Subject: fu on status                                     Fu on status of testing being scheduled    Per Ermelinda Girls   1. Order US, +/- HIDA scan dx abd pain.   2. Let me know when results are back   3. Let pt know to take it easy for now. Watch fatty foods, go to liquids if he needs to if pain is bad. ER precautions.

## 2025-03-10 ENCOUNTER — PATIENT MESSAGE (OUTPATIENT)
Dept: SURGERY | Facility: CLINIC | Age: 50
End: 2025-03-10
Payer: COMMERCIAL

## 2025-03-13 ENCOUNTER — LAB VISIT (OUTPATIENT)
Dept: LAB | Facility: HOSPITAL | Age: 50
End: 2025-03-13
Attending: SURGERY
Payer: COMMERCIAL

## 2025-03-13 DIAGNOSIS — Z91.89 ELECTROLYTE IMBALANCE RISK: ICD-10-CM

## 2025-03-13 DIAGNOSIS — Z13.0 SCREENING, ANEMIA, DEFICIENCY, IRON: ICD-10-CM

## 2025-03-13 DIAGNOSIS — Z13.21 ENCOUNTER FOR VITAMIN DEFICIENCY SCREENING: ICD-10-CM

## 2025-03-13 LAB
ALBUMIN SERPL-MCNC: 4 G/DL (ref 3.5–5)
ALBUMIN/GLOB SERPL: 1.5 RATIO (ref 1.1–2)
ALP SERPL-CCNC: 74 UNIT/L (ref 40–150)
ALT SERPL-CCNC: 14 UNIT/L (ref 0–55)
ANION GAP SERPL CALC-SCNC: 9 MEQ/L
AST SERPL-CCNC: 13 UNIT/L (ref 5–34)
BASOPHILS # BLD AUTO: 0.03 X10(3)/MCL
BASOPHILS NFR BLD AUTO: 0.5 %
BILIRUB SERPL-MCNC: 0.7 MG/DL
BUN SERPL-MCNC: 35.4 MG/DL (ref 8.4–25.7)
CALCIUM SERPL-MCNC: 9.1 MG/DL (ref 8.4–10.2)
CHLORIDE SERPL-SCNC: 107 MMOL/L (ref 98–107)
CO2 SERPL-SCNC: 23 MMOL/L (ref 22–29)
CREAT SERPL-MCNC: 0.97 MG/DL (ref 0.72–1.25)
CREAT/UREA NIT SERPL: 36
EOSINOPHIL # BLD AUTO: 0.06 X10(3)/MCL (ref 0–0.9)
EOSINOPHIL NFR BLD AUTO: 0.9 %
ERYTHROCYTE [DISTWIDTH] IN BLOOD BY AUTOMATED COUNT: 12.4 % (ref 11.5–17)
GFR SERPLBLD CREATININE-BSD FMLA CKD-EPI: >60 ML/MIN/1.73/M2
GLOBULIN SER-MCNC: 2.7 GM/DL (ref 2.4–3.5)
GLUCOSE SERPL-MCNC: 80 MG/DL (ref 74–100)
HCT VFR BLD AUTO: 42 % (ref 42–52)
HGB BLD-MCNC: 14.5 G/DL (ref 14–18)
IMM GRANULOCYTES # BLD AUTO: 0.01 X10(3)/MCL (ref 0–0.04)
IMM GRANULOCYTES NFR BLD AUTO: 0.2 %
IRON SATN MFR SERPL: 33 % (ref 20–50)
IRON SERPL-MCNC: 84 UG/DL (ref 65–175)
LYMPHOCYTES # BLD AUTO: 1.3 X10(3)/MCL (ref 0.6–4.6)
LYMPHOCYTES NFR BLD AUTO: 19.9 %
MCH RBC QN AUTO: 28.9 PG (ref 27–31)
MCHC RBC AUTO-ENTMCNC: 34.5 G/DL (ref 33–36)
MCV RBC AUTO: 83.8 FL (ref 80–94)
MONOCYTES # BLD AUTO: 0.44 X10(3)/MCL (ref 0.1–1.3)
MONOCYTES NFR BLD AUTO: 6.7 %
NEUTROPHILS # BLD AUTO: 4.7 X10(3)/MCL (ref 2.1–9.2)
NEUTROPHILS NFR BLD AUTO: 71.8 %
NRBC BLD AUTO-RTO: 0 %
PLATELET # BLD AUTO: 269 X10(3)/MCL (ref 130–400)
PMV BLD AUTO: 10.3 FL (ref 7.4–10.4)
POTASSIUM SERPL-SCNC: 4.2 MMOL/L (ref 3.5–5.1)
PROT SERPL-MCNC: 6.7 GM/DL (ref 6.4–8.3)
RBC # BLD AUTO: 5.01 X10(6)/MCL (ref 4.7–6.1)
SODIUM SERPL-SCNC: 139 MMOL/L (ref 136–145)
TIBC SERPL-MCNC: 171 UG/DL (ref 60–240)
TIBC SERPL-MCNC: 255 UG/DL (ref 250–450)
TRANSFERRIN SERPL-MCNC: 225 MG/DL (ref 174–364)
VIT B12 SERPL-MCNC: 571 PG/ML (ref 213–816)
WBC # BLD AUTO: 6.54 X10(3)/MCL (ref 4.5–11.5)

## 2025-03-13 PROCEDURE — 80053 COMPREHEN METABOLIC PANEL: CPT

## 2025-03-13 PROCEDURE — 84425 ASSAY OF VITAMIN B-1: CPT

## 2025-03-13 PROCEDURE — 85025 COMPLETE CBC W/AUTO DIFF WBC: CPT

## 2025-03-13 PROCEDURE — 83550 IRON BINDING TEST: CPT

## 2025-03-13 PROCEDURE — 82607 VITAMIN B-12: CPT

## 2025-03-13 PROCEDURE — 36415 COLL VENOUS BLD VENIPUNCTURE: CPT

## 2025-03-17 LAB — VIT B1 BLD-SCNC: 142 NMOL/L (ref 70–180)

## 2025-03-18 ENCOUNTER — CLINICAL SUPPORT (OUTPATIENT)
Dept: BARIATRICS | Facility: HOSPITAL | Age: 50
End: 2025-03-18

## 2025-03-18 ENCOUNTER — OFFICE VISIT (OUTPATIENT)
Dept: SURGERY | Facility: CLINIC | Age: 50
End: 2025-03-18
Payer: COMMERCIAL

## 2025-03-18 VITALS
WEIGHT: 218.13 LBS | WEIGHT: 218 LBS | BODY MASS INDEX: 33.64 KG/M2 | HEIGHT: 68 IN | HEART RATE: 59 BPM | SYSTOLIC BLOOD PRESSURE: 122 MMHG | DIASTOLIC BLOOD PRESSURE: 76 MMHG | BODY MASS INDEX: 33.06 KG/M2

## 2025-03-18 VITALS — WEIGHT: 217 LBS | HEIGHT: 68 IN | BODY MASS INDEX: 32.89 KG/M2

## 2025-03-18 DIAGNOSIS — E66.9 OBESITY: Primary | ICD-10-CM

## 2025-03-18 DIAGNOSIS — Z71.3 DIETARY COUNSELING: ICD-10-CM

## 2025-03-18 DIAGNOSIS — Z13.0 SCREENING, ANEMIA, DEFICIENCY, IRON: ICD-10-CM

## 2025-03-18 DIAGNOSIS — Z98.84 HISTORY OF BARIATRIC SURGERY: Primary | ICD-10-CM

## 2025-03-18 DIAGNOSIS — Z13.21 ENCOUNTER FOR VITAMIN DEFICIENCY SCREENING: Primary | ICD-10-CM

## 2025-03-18 DIAGNOSIS — Z71.3 ENCOUNTER FOR WEIGHT LOSS COUNSELING: ICD-10-CM

## 2025-03-18 DIAGNOSIS — Z90.3 S/P GASTRIC SLEEVE PROCEDURE: ICD-10-CM

## 2025-03-18 DIAGNOSIS — E66.9 OBESITY, UNSPECIFIED CLASS, UNSPECIFIED OBESITY TYPE, UNSPECIFIED WHETHER SERIOUS COMORBIDITY PRESENT: ICD-10-CM

## 2025-03-18 DIAGNOSIS — Z91.89 ELECTROLYTE IMBALANCE RISK: ICD-10-CM

## 2025-03-18 DIAGNOSIS — M10.9 GOUT, UNSPECIFIED CAUSE, UNSPECIFIED CHRONICITY, UNSPECIFIED SITE: ICD-10-CM

## 2025-03-18 DIAGNOSIS — Z71.82 EXERCISE COUNSELING: ICD-10-CM

## 2025-03-18 PROCEDURE — 3078F DIAST BP <80 MM HG: CPT | Mod: CPTII,,, | Performed by: NURSE PRACTITIONER

## 2025-03-18 PROCEDURE — 3074F SYST BP LT 130 MM HG: CPT | Mod: CPTII,,, | Performed by: NURSE PRACTITIONER

## 2025-03-18 PROCEDURE — 99214 OFFICE O/P EST MOD 30 MIN: CPT | Mod: ,,, | Performed by: NURSE PRACTITIONER

## 2025-03-18 PROCEDURE — 3008F BODY MASS INDEX DOCD: CPT | Mod: CPTII,,, | Performed by: NURSE PRACTITIONER

## 2025-03-18 PROCEDURE — 1159F MED LIST DOCD IN RCRD: CPT | Mod: CPTII,,, | Performed by: NURSE PRACTITIONER

## 2025-03-18 NOTE — PROGRESS NOTES
Progress Note  Deion Meza  Chief Complaint   Patient presents with    Follow-up     [facility]G 09/16/24- 6 month fu     History of Present Illness:  Mr. Deion Meza is a 49 y.o. male who is 6 mth s/p Lap Sleeve Gastrectomy on 9/16/24 by Dr. Yury Narvaez.   Pathology benign.   Presents today for routine 6 mth follow up appt.   No complaints. No dysphagia, odynophagia, GERD, NV, or abd pain. Regular BMs.     Pt had gout flare up and took colchicine for two days and got much better. Denies any other issues at this time.      Labs (3/13/25): WNLs  Labs (11/11/24): WNLs     Diet: compliant with bariatric meal plan, tolerating bariatric clears  Exercise: regular exercise, walking   Vitamins: compliant with bariatric supplementation per protocol     Ht: 67.5 in.  Weights:   Trend Weights BMI   Starting 293# 45   Pre-Op 290# 44   2 Week 271#  41.82   2 Month 239#  37   6 Month 218#  33    1 Year       2 Year                                Pertinent History:  HTN - [x] Yes   [] No    [] Resolved  HLD - [x] Yes   [] No    [] Resolved  DM  -  [] Yes   [x] No    [] Resolved  NATHANIEL - [x] Yes   [] No   [] Resolved  GERD - [] Yes   [x] No [] Resolved  Anticoagulation- [] Yes   [x] No      If yes, type: [] ASA [] Plavix [] Other    Patient Care Team:  Tl Santana MD as PCP - General (Family Medicine)  Yury Narvaez MD as Surgeon (Bariatrics)     Subjective:     12 point review of systems conducted, negative except as stated in the history of present illness. See HPI for details.    Review of patient's allergies indicates:  No Known Allergies  Past Medical History:   Diagnosis Date    High cholesterol     Hypertension     Kidney stone     Morbid obesity with BMI of 45.0-49.9, adult     Skin cancer     Sleep apnea     uses CPAP     Past Surgical History:   Procedure Laterality Date    COLONOSCOPY      ESOPHAGOGASTRODUODENOSCOPY N/A 8/30/2024    Procedure: EGD (ESOPHAGOGASTRODUODENOSCOPY);  Surgeon: Solange  "MD Yury;  Location: [facility] OR;  Service: General;  Laterality: N/A;    EYE SURGERY      LAPAROSCOPIC SLEEVE GASTRECTOMY N/A 9/16/2024    Procedure: GASTRECTOMY, SLEEVE, LAPAROSCOPIC;  Surgeon: Yury Narvaez MD;  Location: [facility] OR;  Service: General;  Laterality: N/A;  WITH AUTIN    NASAL SINUS SURGERY      Skin cancer removed      x5     Family History   Problem Relation Name Age of Onset    Hyperlipidemia Mother Alysa Meza     Hypertension Father Rodrigo Meza     Heart disease Father Rodrigo Meza     Kidney failure Paternal Grandmother Jada Meza      Social History[1]   Current Outpatient Medications   Medication Instructions    UNABLE TO FIND Bariatric multi vitamin       Objective:     Vital Signs (Most Recent):  Visit Vitals  /76   Pulse (!) 59   Ht 5' 7.5" (1.715 m)   Wt 98.9 kg (218 lb 1.6 oz)   BMI 33.66 kg/m²       Physical Exam:  General:  Alert and oriented.    Respiratory:  Lungs are clear to auscultation, Respirations are non-labored, Breath sounds are equal.    Cardiovascular:  Normal rate, Regular rhythm, No murmur.    Gastrointestinal:  Soft, Non-tender, Non-distended, Normal bowel sounds    Musculoskeletal:  Normal range of motion, Normal strength.    Integumentary:  Warm, Dry, Pink.    Neurologic:  Alert, Oriented.    Psychiatric:  Cooperative.      Assessment:       ICD-10-CM ICD-9-CM   1. Encounter for vitamin deficiency screening  Z13.21 V77.99   2. Screening, anemia, deficiency, iron  Z13.0 V78.0   3. Electrolyte imbalance risk  Z91.89 V49.89   4. Encounter for weight loss counseling  Z71.3 V65.3   5. Exercise counseling  Z71.82 V65.41   6. Dietary counseling  Z71.3 V65.3   7. Obesity, unspecified class, unspecified obesity type, unspecified whether serious comorbidity present  E66.9 278.00   8. S/P gastric sleeve procedure  Z90.3 V45.75   9. Gout, unspecified cause, unspecified chronicity, unspecified site  M10.9 274.9       Plan:     1. Encounter for vitamin deficiency " screening  Vitamin B1, WB    Vitamin B12      2. Screening, anemia, deficiency, iron  Iron and TIBC    CBC Auto Differential      3. Electrolyte imbalance risk  Comprehensive Metabolic Panel      4. Encounter for weight loss counseling        5. Exercise counseling        6. Dietary counseling        7. Obesity, unspecified class, unspecified obesity type, unspecified whether serious comorbidity present        8. S/P gastric sleeve procedure        9. Gout, unspecified cause, unspecified chronicity, unspecified site              - keep up the great work!   - ok to take colchicine PRN for gout flare up, make sure to stay hydrated.       - continue to exercise  - F/U 6 months with bariatric labs  - Continue diet  - Continue vitamin supplementation  - Support group attendance encouraged  - Keep routine appts with PCP/specialists as scheduled                [1]   Social History  Tobacco Use    Smoking status: Never    Smokeless tobacco: Never   Substance Use Topics    Alcohol use: Never    Drug use: Never

## 2025-03-18 NOTE — PROGRESS NOTES
"POST OP BARIATRIC NUTRITION FOLLOW UP    Type of surgery: sleeve gastrectomy  Post-op visit:   [] 2 weeks  [] 4 weeks:  [] 2 months:  [] 4 months:  [x] 6 months:  [] 9 months:  [] 1 year:   [] Other:     Height:   Ht Readings from Last 1 Encounters:   03/18/25 5' 7.5" (1.715 m)      Weight:   Wt Readings from Last 3 Encounters:   03/18/25 1331 98.4 kg (217 lb)   11/19/24 1457 108.8 kg (239 lb 12.8 oz)   11/19/24 1421 109.5 kg (241 lb 8 oz)      BMI:   BMI Readings from Last 1 Encounters:   03/18/25 33.49 kg/m²            Post op complications:   [] Yes [x] No  If yes: [] Nausea   [] Vomiting   [] Constipation    [] Diarrhea    [] Other:     Dietary tolerance:  [x] Yes [] No [] Comment:     Adequate fluid intake:  [x] Yes [] No Approx. daily fluid intake:   Adequate protein intake:  [x] Yes [] No  Approx. daily protein intake:    Vitamins/Minerals:   [] MVI:    [] Biotin:  [] Calcium:    [] Hair/Nails:  [] B 50 complex:   [] Bariatric Specific MVI:  [] B12:    [] Bariatric Specific Calcium:  [] Iron:    [] Other:   [] Non-compliance:        Labs:   slight elevation in BUN  Comment:    Expected compliance:  [x]Good  []Fair  []Poor      Progress:   [x]Patient progressing well at this time with no complaints   [] Patient expressed complaint at this time: See additional notes       Bariatric Diet Education:   Verbalizes understanding Demonstrates  Needs further teaching Needs practice/ supervision Comments    Bariatric Clear [] [] [] []    Bariatric Full [] [] [] []    Bariatric Puree [] [] [] []    Bariatric Soft [] [] [] []    Bariatric Regular [] [] [] []    Bariatric Reintroduction of Starchy CHO [x] [] [] []    Bariatric: Mindful Eating [] [] [] []    Importance of Protein and Vitamin Protocol [] [] [] []    Other:            Additional Notes:        "

## 2025-03-18 NOTE — PROGRESS NOTES
A 6 month F/U was conducted with Deion. Current weight is 218.1 lbs.  Patient has lost 81 lbs. since his preop class. A body composition was conducted and patient was educated on results. Patient's goal weight is 200 lbs.  Patient states he is running and had a gout flare up where he took some time off, but is getting back into routine. No issues or concerns at this time.     It is my professional opinion that patient is in the action phase of behavior change.      ROSA ISELA Rios, CPT, CHC

## (undated) DEVICE — NDL SAF 21GX1-1/2IN HYPO BVL

## (undated) DEVICE — TROCAR LAPSCP XCEL 12MM 10CM

## (undated) DEVICE — SOL IRRI STRL WATER 1000ML

## (undated) DEVICE — STRIP MEDI WND CLSR 1/2X4IN

## (undated) DEVICE — GLOVE SENSICARE PI SURG 6

## (undated) DEVICE — SUT VICRYL+ 27 UR-6 VIOL

## (undated) DEVICE — DRAPE SLUSH WARMER 66X44IN

## (undated) DEVICE — CUSHION  WC FOAM 20X20X.75IN

## (undated) DEVICE — GLOVE PROTEXIS HYDROGEL SZ7.5

## (undated) DEVICE — TROCAR ENDOPATH XCEL 5X100MM

## (undated) DEVICE — MATTRESS HOVERMAT SPLIT TRNSF

## (undated) DEVICE — RELOAD ECHELON FLEX BLU 60MM

## (undated) DEVICE — TROCAR ENDOPATH XCEL 11MM 10CM

## (undated) DEVICE — STAPLER ECHELON LONG 60X440MM

## (undated) DEVICE — POSITIONER HEEL FOAM CONVOLTD

## (undated) DEVICE — PAD PREP CUFFED NS 24X48IN

## (undated) DEVICE — SUT VICRYL PLUS 4-0 FS-2 27IN

## (undated) DEVICE — KIT GEN LAPAROSCOPY LAFAYETTE

## (undated) DEVICE — IRRIGATOR HYDRO-SURG PLUS 5MM

## (undated) DEVICE — SHEARS HS LONG 5MM CURVED

## (undated) DEVICE — COVER MAYO STAND REINFRCD 30

## (undated) DEVICE — SYR ONLY LUER LOCK 20CC

## (undated) DEVICE — GLOVE PROTEXIS LTX MICRO  7.5

## (undated) DEVICE — BINDER ABD 12IN MED/LG 46-62IN

## (undated) DEVICE — KIT SURGICAL TURNOVER

## (undated) DEVICE — APPLICATOR VISTASEAL RIG 35CM

## (undated) DEVICE — CANNULA ENDOPATH XCEL 5X100MM

## (undated) DEVICE — CHLORAPREP W TINT 26ML APPL

## (undated) DEVICE — TROCAR ENDOPATH XCEL 15MM 10CM

## (undated) DEVICE — APPLICATOR CHLORAPREP ORN 26ML

## (undated) DEVICE — ELECTRODE PATIENT RETURN DISP

## (undated) DEVICE — SCISSOR CURVED ENDOPATH 5MM

## (undated) DEVICE — RELOAD ECHELON FLEX GRN 60MM

## (undated) DEVICE — HEMOSTAT SURGICEL FIBRLR 2X4IN

## (undated) DEVICE — HOLDER STRIP-T SELF ADH 2X10IN

## (undated) DEVICE — CLIP ENDO LIGATION LARGE CLIPS

## (undated) DEVICE — NDL GRANEE OPEN LOOP GRASPER

## (undated) DEVICE — SEALANT VISTASEAL FIBRIN 10ML